# Patient Record
Sex: FEMALE
[De-identification: names, ages, dates, MRNs, and addresses within clinical notes are randomized per-mention and may not be internally consistent; named-entity substitution may affect disease eponyms.]

---

## 2018-09-07 ENCOUNTER — APPOINTMENT (OUTPATIENT)
Dept: ANTEPARTUM | Facility: CLINIC | Age: 33
End: 2018-09-07
Payer: COMMERCIAL

## 2018-09-07 ENCOUNTER — ASOB RESULT (OUTPATIENT)
Age: 33
End: 2018-09-07

## 2018-09-07 PROBLEM — Z00.00 ENCOUNTER FOR PREVENTIVE HEALTH EXAMINATION: Status: ACTIVE | Noted: 2018-09-07

## 2018-09-07 PROCEDURE — 76801 OB US < 14 WKS SINGLE FETUS: CPT

## 2018-09-07 PROCEDURE — 76813 OB US NUCHAL MEAS 1 GEST: CPT

## 2018-10-31 ENCOUNTER — APPOINTMENT (OUTPATIENT)
Dept: ANTEPARTUM | Facility: CLINIC | Age: 33
End: 2018-10-31

## 2018-11-02 ENCOUNTER — ASOB RESULT (OUTPATIENT)
Age: 33
End: 2018-11-02

## 2018-11-02 ENCOUNTER — APPOINTMENT (OUTPATIENT)
Dept: ANTEPARTUM | Facility: CLINIC | Age: 33
End: 2018-11-02
Payer: COMMERCIAL

## 2018-11-02 PROCEDURE — 76811 OB US DETAILED SNGL FETUS: CPT

## 2018-11-16 ENCOUNTER — TRANSCRIPTION ENCOUNTER (OUTPATIENT)
Age: 33
End: 2018-11-16

## 2019-01-03 ENCOUNTER — ASOB RESULT (OUTPATIENT)
Age: 34
End: 2019-01-03

## 2019-01-03 ENCOUNTER — APPOINTMENT (OUTPATIENT)
Dept: ANTEPARTUM | Facility: CLINIC | Age: 34
End: 2019-01-03
Payer: COMMERCIAL

## 2019-01-03 PROCEDURE — 76816 OB US FOLLOW-UP PER FETUS: CPT

## 2019-01-16 ENCOUNTER — APPOINTMENT (OUTPATIENT)
Dept: MATERNAL FETAL MEDICINE | Facility: CLINIC | Age: 34
End: 2019-01-16
Payer: COMMERCIAL

## 2019-01-16 ENCOUNTER — ASOB RESULT (OUTPATIENT)
Age: 34
End: 2019-01-16

## 2019-01-16 VITALS — WEIGHT: 207.5 LBS | HEIGHT: 61 IN | BODY MASS INDEX: 39.18 KG/M2

## 2019-01-16 DIAGNOSIS — Z86.79 PERSONAL HISTORY OF OTHER DISEASES OF THE CIRCULATORY SYSTEM: ICD-10-CM

## 2019-01-16 DIAGNOSIS — Z83.3 FAMILY HISTORY OF DIABETES MELLITUS: ICD-10-CM

## 2019-01-16 PROCEDURE — G0108 DIAB MANAGE TRN  PER INDIV: CPT

## 2019-01-31 ENCOUNTER — APPOINTMENT (OUTPATIENT)
Dept: ANTEPARTUM | Facility: CLINIC | Age: 34
End: 2019-01-31
Payer: COMMERCIAL

## 2019-01-31 ENCOUNTER — APPOINTMENT (OUTPATIENT)
Dept: MATERNAL FETAL MEDICINE | Facility: CLINIC | Age: 34
End: 2019-01-31
Payer: COMMERCIAL

## 2019-01-31 ENCOUNTER — ASOB RESULT (OUTPATIENT)
Age: 34
End: 2019-01-31

## 2019-01-31 VITALS
WEIGHT: 208 LBS | HEART RATE: 88 BPM | DIASTOLIC BLOOD PRESSURE: 62 MMHG | SYSTOLIC BLOOD PRESSURE: 116 MMHG | HEIGHT: 61 IN | BODY MASS INDEX: 39.27 KG/M2

## 2019-01-31 DIAGNOSIS — J45.909 UNSPECIFIED ASTHMA, UNCOMPLICATED: ICD-10-CM

## 2019-01-31 DIAGNOSIS — Z82.49 FAMILY HISTORY OF ISCHEMIC HEART DISEASE AND OTHER DISEASES OF THE CIRCULATORY SYSTEM: ICD-10-CM

## 2019-01-31 PROCEDURE — 76818 FETAL BIOPHYS PROFILE W/NST: CPT

## 2019-01-31 PROCEDURE — 76816 OB US FOLLOW-UP PER FETUS: CPT

## 2019-01-31 PROCEDURE — 99243 OFF/OP CNSLTJ NEW/EST LOW 30: CPT

## 2019-01-31 NOTE — DISCUSSION/SUMMARY
[FreeTextEntry1] : The patient is a 33-year-old  being seen today at approximately 33 weeks for gestational diabetes A1 as well as chronic hypertension.\par \par The patient has had nutritional counseling. Evaluation of her diabetic flow sheets reveals that all of her fasting and majority of her postprandial blood sugars are within normal limits. those postprandial blood sugars that are elevated are due to dietary intake. A level 1 ultrasound was performed today which does reveal a single viable intrauterine gestation with the estimated fetal weight 4 lbs. 5 oz. consistent with the 42nd percentile. Vertex presentation with anterior placenta is seen and the amniotic fluid index is normal at 15.92 cm. Vital signs today reveal a blood pressure of 116/62 and maternal weight is 208 pounds which is a half pound weight gain from the previous evaluation done on . This is consistent with a BMI of 39.30KG.\par \par Management of the pregnancy was discussed. The patient will continue on the current ADA diet and will continue home glucose monitoring. She has a followup nutrition consultation next week. She will return in one month for a followup growth scan and NST as well as followup maternal fetal medicine consultation. Weekly  testing will be performed after that until delivery. Dina also states that she was on labetalol until the second trimester when her blood pressure was found to be low and has been normotensive since. Careful evaluation for possible preeclampsia at the end of this pregnancy is recommended. All of the above was discussed with the patient and her  all of their questions were answered.\par \par Family history is significant for her mother with hypertensive disease as well as a myocardial infarction. Father does suffer from A. fib. Her past medical history significant for hypertension diagnosed approximately 4 years ago. She has no previous surgical history hand is allergic to ciprofloxacin. She denies alcohol, tobacco or drug use.\par \par Recommendations;\par \par #1. Continue current ADA diet.\par #2. Initial consultation in one week.\par #3. Growth scan in one month is recommended.\par #4. Weekly  testing beginning at 37 weeks until delivery is recommended.\par #5. MFM consultation one month is recommended.

## 2019-02-06 ENCOUNTER — ASOB RESULT (OUTPATIENT)
Age: 34
End: 2019-02-06

## 2019-02-06 ENCOUNTER — APPOINTMENT (OUTPATIENT)
Dept: MATERNAL FETAL MEDICINE | Facility: CLINIC | Age: 34
End: 2019-02-06
Payer: COMMERCIAL

## 2019-02-06 VITALS — HEIGHT: 61 IN | WEIGHT: 207.38 LBS | BODY MASS INDEX: 39.16 KG/M2

## 2019-02-06 PROCEDURE — G0108 DIAB MANAGE TRN  PER INDIV: CPT

## 2019-02-13 ENCOUNTER — ASOB RESULT (OUTPATIENT)
Age: 34
End: 2019-02-13

## 2019-02-13 ENCOUNTER — APPOINTMENT (OUTPATIENT)
Dept: MATERNAL FETAL MEDICINE | Facility: CLINIC | Age: 34
End: 2019-02-13
Payer: COMMERCIAL

## 2019-02-13 VITALS — WEIGHT: 210.13 LBS | BODY MASS INDEX: 39.67 KG/M2 | HEIGHT: 61 IN

## 2019-02-13 PROCEDURE — XXXXX: CPT

## 2019-02-28 ENCOUNTER — APPOINTMENT (OUTPATIENT)
Dept: ANTEPARTUM | Facility: CLINIC | Age: 34
End: 2019-02-28
Payer: COMMERCIAL

## 2019-02-28 ENCOUNTER — APPOINTMENT (OUTPATIENT)
Dept: MATERNAL FETAL MEDICINE | Facility: CLINIC | Age: 34
End: 2019-02-28
Payer: COMMERCIAL

## 2019-02-28 ENCOUNTER — ASOB RESULT (OUTPATIENT)
Age: 34
End: 2019-02-28

## 2019-02-28 VITALS
HEIGHT: 61 IN | SYSTOLIC BLOOD PRESSURE: 128 MMHG | BODY MASS INDEX: 39.84 KG/M2 | WEIGHT: 211 LBS | HEART RATE: 82 BPM | DIASTOLIC BLOOD PRESSURE: 78 MMHG

## 2019-02-28 DIAGNOSIS — O10.919 UNSPECIFIED PRE-EXISTING HYPERTENSION COMPLICATING PREGNANCY, UNSPECIFIED TRIMESTER: ICD-10-CM

## 2019-02-28 PROCEDURE — 76818 FETAL BIOPHYS PROFILE W/NST: CPT

## 2019-02-28 PROCEDURE — 99242 OFF/OP CONSLTJ NEW/EST SF 20: CPT

## 2019-02-28 PROCEDURE — 76816 OB US FOLLOW-UP PER FETUS: CPT

## 2019-02-28 NOTE — DISCUSSION/SUMMARY
[FreeTextEntry1] : Please see the previous consultation for the patient's medical and obstetrical history.\par \par Evaluation of the patient's diabetic flow sheets reveals good control of both fasting and postprandial blood sugars. A level I ultrasound was performed today which does reveal a single viable intrauterine gestation with the estimated fetal weight 6 lbs. 10 oz. which is consistent with the 59th %.  Vertex presentation and anterior placenta seen and amniotic fluid index is normal at 13.0 cm. Vital signs today reveal blood pressure 120/78 and maternal weight is 211 pounds which is a 1 pound weight gain from previous evaluation done on February 13th. This is consistent with a BMI of 39.87KG.\par \par Management of the pregnancy was discussed. At this time no change in her medical management is indicated. Weekly biophysical profile and NST is recommend until delivery. Delivery by the patient's EDC is also recommended. All of the above was discussed with the patient and her  and all their questions were answered.\par \par Recommendations;\par \par #1. Continue current ADA diet.\par #2. Weekly biophysical profile in our office and NST in your office.\par #3. Delivery by the patient's EDC is recommended.\par #4. Followup MFM consultation if clinically indicated.

## 2019-03-01 ENCOUNTER — TRANSCRIPTION ENCOUNTER (OUTPATIENT)
Age: 34
End: 2019-03-01

## 2019-03-07 ENCOUNTER — APPOINTMENT (OUTPATIENT)
Dept: ANTEPARTUM | Facility: CLINIC | Age: 34
End: 2019-03-07
Payer: COMMERCIAL

## 2019-03-07 ENCOUNTER — APPOINTMENT (OUTPATIENT)
Age: 34
End: 2019-03-07
Payer: COMMERCIAL

## 2019-03-07 ENCOUNTER — ASOB RESULT (OUTPATIENT)
Age: 34
End: 2019-03-07

## 2019-03-07 PROCEDURE — 76819 FETAL BIOPHYS PROFIL W/O NST: CPT

## 2019-03-14 ENCOUNTER — APPOINTMENT (OUTPATIENT)
Age: 34
End: 2019-03-14
Payer: COMMERCIAL

## 2019-03-14 ENCOUNTER — APPOINTMENT (OUTPATIENT)
Dept: ANTEPARTUM | Facility: CLINIC | Age: 34
End: 2019-03-14
Payer: COMMERCIAL

## 2019-03-14 ENCOUNTER — ASOB RESULT (OUTPATIENT)
Age: 34
End: 2019-03-14

## 2019-03-14 PROCEDURE — 76815 OB US LIMITED FETUS(S): CPT

## 2019-03-14 PROCEDURE — 76819 FETAL BIOPHYS PROFIL W/O NST: CPT

## 2019-03-15 ENCOUNTER — INPATIENT (INPATIENT)
Facility: HOSPITAL | Age: 34
LOS: 4 days | Discharge: HOME | End: 2019-03-20
Attending: OBSTETRICS & GYNECOLOGY | Admitting: OBSTETRICS & GYNECOLOGY
Payer: COMMERCIAL

## 2019-03-15 ENCOUNTER — TRANSCRIPTION ENCOUNTER (OUTPATIENT)
Age: 34
End: 2019-03-15

## 2019-03-15 VITALS
WEIGHT: 214.95 LBS | HEIGHT: 61 IN | HEART RATE: 96 BPM | SYSTOLIC BLOOD PRESSURE: 123 MMHG | RESPIRATION RATE: 18 BRPM | DIASTOLIC BLOOD PRESSURE: 59 MMHG | TEMPERATURE: 99 F

## 2019-03-15 DIAGNOSIS — O26.893 OTHER SPECIFIED PREGNANCY RELATED CONDITIONS, THIRD TRIMESTER: ICD-10-CM

## 2019-03-15 DIAGNOSIS — O47.1 FALSE LABOR AT OR AFTER 37 COMPLETED WEEKS OF GESTATION: ICD-10-CM

## 2019-03-15 LAB
ABO RH CONFIRMATION: SIGNIFICANT CHANGE UP
APPEARANCE UR: CLEAR — SIGNIFICANT CHANGE UP
BASOPHILS # BLD AUTO: 0 K/UL — SIGNIFICANT CHANGE UP (ref 0–0.2)
BASOPHILS NFR BLD AUTO: 0.1 % — SIGNIFICANT CHANGE UP (ref 0–2)
BILIRUB UR-MCNC: NEGATIVE — SIGNIFICANT CHANGE UP
BLD GP AB SCN SERPL QL: SIGNIFICANT CHANGE UP
COLOR SPEC: YELLOW — SIGNIFICANT CHANGE UP
DIFF PNL FLD: NEGATIVE — SIGNIFICANT CHANGE UP
DIR ANTIGLOB POLYSPECIFIC INTERPRETATION: SIGNIFICANT CHANGE UP
EOSINOPHIL # BLD AUTO: 0.1 K/UL — SIGNIFICANT CHANGE UP (ref 0–0.5)
EOSINOPHIL NFR BLD AUTO: 1 % — SIGNIFICANT CHANGE UP (ref 0–6)
GLUCOSE BLDC GLUCOMTR-MCNC: 105 MG/DL — HIGH (ref 70–99)
GLUCOSE BLDC GLUCOMTR-MCNC: 106 MG/DL — HIGH (ref 70–99)
GLUCOSE BLDC GLUCOMTR-MCNC: 85 MG/DL — SIGNIFICANT CHANGE UP (ref 70–99)
GLUCOSE UR QL: NEGATIVE MG/DL — SIGNIFICANT CHANGE UP
HCT VFR BLD CALC: 34.3 % — LOW (ref 37–47)
HGB BLD-MCNC: 11.6 G/DL — LOW (ref 12–16)
KETONES UR-MCNC: NEGATIVE — SIGNIFICANT CHANGE UP
LEUKOCYTE ESTERASE UR-ACNC: NEGATIVE — SIGNIFICANT CHANGE UP
LYMPHOCYTES # BLD AUTO: 1.2 K/UL — SIGNIFICANT CHANGE UP (ref 1–4.8)
LYMPHOCYTES # BLD AUTO: 13.5 % — LOW (ref 20–55)
MCHC RBC-ENTMCNC: 31.7 PG — HIGH (ref 27–31)
MCHC RBC-ENTMCNC: 33.8 G/DL — SIGNIFICANT CHANGE UP (ref 32–36)
MCV RBC AUTO: 93.7 FL — SIGNIFICANT CHANGE UP (ref 81–99)
MONOCYTES # BLD AUTO: 0.7 K/UL — SIGNIFICANT CHANGE UP (ref 0–0.8)
MONOCYTES NFR BLD AUTO: 8 % — SIGNIFICANT CHANGE UP (ref 3–10)
NEUTROPHILS # BLD AUTO: 7 K/UL — SIGNIFICANT CHANGE UP (ref 1.8–8)
NEUTROPHILS NFR BLD AUTO: 76.8 % — HIGH (ref 37–73)
NITRITE UR-MCNC: NEGATIVE — SIGNIFICANT CHANGE UP
PH UR: 5 — SIGNIFICANT CHANGE UP (ref 5–8)
PLATELET # BLD AUTO: 242 K/UL — SIGNIFICANT CHANGE UP (ref 150–400)
PROT UR-MCNC: NEGATIVE MG/DL — SIGNIFICANT CHANGE UP
RBC # BLD: 3.66 M/UL — LOW (ref 4.4–5.2)
RBC # FLD: 13.5 % — SIGNIFICANT CHANGE UP (ref 11–15.6)
SP GR SPEC: 1.02 — SIGNIFICANT CHANGE UP (ref 1.01–1.02)
TYPE + AB SCN PNL BLD: SIGNIFICANT CHANGE UP
UROBILINOGEN FLD QL: NEGATIVE MG/DL — SIGNIFICANT CHANGE UP
WBC # BLD: 9.1 K/UL — SIGNIFICANT CHANGE UP (ref 4.8–10.8)
WBC # FLD AUTO: 9.1 K/UL — SIGNIFICANT CHANGE UP (ref 4.8–10.8)

## 2019-03-15 RX ORDER — SODIUM CHLORIDE 9 MG/ML
1000 INJECTION, SOLUTION INTRAVENOUS
Qty: 0 | Refills: 0 | Status: DISCONTINUED | OUTPATIENT
Start: 2019-03-15 | End: 2019-03-16

## 2019-03-15 RX ORDER — CEFAZOLIN SODIUM 1 G
2000 VIAL (EA) INJECTION ONCE
Qty: 0 | Refills: 0 | Status: DISCONTINUED | OUTPATIENT
Start: 2019-03-15 | End: 2019-03-15

## 2019-03-15 RX ORDER — SODIUM CHLORIDE 9 MG/ML
1000 INJECTION, SOLUTION INTRAVENOUS ONCE
Qty: 0 | Refills: 0 | Status: DISCONTINUED | OUTPATIENT
Start: 2019-03-15 | End: 2019-03-16

## 2019-03-15 RX ORDER — OXYTOCIN 10 UNIT/ML
333.33 VIAL (ML) INJECTION
Qty: 20 | Refills: 0 | Status: DISCONTINUED | OUTPATIENT
Start: 2019-03-15 | End: 2019-03-16

## 2019-03-15 RX ORDER — CITRIC ACID/SODIUM CITRATE 300-500 MG
30 SOLUTION, ORAL ORAL ONCE
Qty: 0 | Refills: 0 | Status: COMPLETED | OUTPATIENT
Start: 2019-03-15 | End: 2019-03-16

## 2019-03-15 RX ADMIN — SODIUM CHLORIDE 125 MILLILITER(S): 9 INJECTION, SOLUTION INTRAVENOUS at 20:25

## 2019-03-15 RX ADMIN — SODIUM CHLORIDE 125 MILLILITER(S): 9 INJECTION, SOLUTION INTRAVENOUS at 13:01

## 2019-03-15 NOTE — OB RN PATIENT PROFILE - PMH
Diet controlled gestational diabetes mellitus (GDM) in third trimester    Hypertension, unspecified type  On Labetalol before pregnancy and in first trimester Asthma    Diet controlled gestational diabetes mellitus (GDM) in third trimester    GERD (gastroesophageal reflux disease)    Hypertension, unspecified type  On Labetalol before pregnancy and in first trimester

## 2019-03-15 NOTE — OB PROVIDER H&P - PMH
Diet controlled gestational diabetes mellitus (GDM) in third trimester    Hypertension, unspecified type  On Labetalol before pregnancy and in first trimester

## 2019-03-15 NOTE — OB RN PATIENT PROFILE - EDUCATION OF PARENTS ON THE BENEFITS OF SKIN TO SKIN AND BREASTFEEDING TO BOTH MOTHER AND INFANT.
Patient was called today to reschedule apt with cardiology. Tried both numbers listed, no answer. Voice mail not set up to leave a message on home number. Busy signal for mobile number.   
Statement Selected

## 2019-03-15 NOTE — OB PROVIDER H&P - HISTORY OF PRESENT ILLNESS
32yo  @ 39.2w, pregnancy complicated by GDMA1, who comes in for an induction. Pt denies any ctx, vb, lof. Pt reports +fm. Pt has h/o of chtn, not requiring medications during pregnancy.

## 2019-03-15 NOTE — CHART NOTE - NSCHARTNOTEFT_GEN_A_CORE
Pt is comfortable    Bedside sono: vertex, anterior fundal placenta on the R side, EFW 3700g  Vital Signs Last 24 Hrs  T(C): 37.4 (15 Mar 2019 12:10), Max: 37.4 (15 Mar 2019 12:10)  T(F): 99.3 (15 Mar 2019 12:10), Max: 99.3 (15 Mar 2019 12:10)  HR: 96 (15 Mar 2019 12:15) (96 - 96)  BP: 123/59 (15 Mar 2019 12:15) (123/59 - 123/59)  RR: 18 (15 Mar 2019 12:10) (18 - 18)    FETAL HEART RATE: 130, moderate, + accels, - decels    [place]: irregular contractions    INTERVENTIONS: start IOL with Cytotec

## 2019-03-15 NOTE — OB PROVIDER H&P - ASSESSMENT
34yo  @ 39.2w a/f IOL for GDMA1. Pt to receive cytotec induction  - Admission labs  - Cytotec induction  - CEFM

## 2019-03-16 ENCOUNTER — TRANSCRIPTION ENCOUNTER (OUTPATIENT)
Age: 34
End: 2019-03-16

## 2019-03-16 LAB
GLUCOSE BLDC GLUCOMTR-MCNC: 78 MG/DL — SIGNIFICANT CHANGE UP (ref 70–99)
GLUCOSE BLDC GLUCOMTR-MCNC: 81 MG/DL — SIGNIFICANT CHANGE UP (ref 70–99)
GLUCOSE BLDC GLUCOMTR-MCNC: 82 MG/DL — SIGNIFICANT CHANGE UP (ref 70–99)
GLUCOSE BLDC GLUCOMTR-MCNC: 82 MG/DL — SIGNIFICANT CHANGE UP (ref 70–99)

## 2019-03-16 RX ORDER — GLYCERIN ADULT
1 SUPPOSITORY, RECTAL RECTAL AT BEDTIME
Qty: 0 | Refills: 0 | Status: DISCONTINUED | OUTPATIENT
Start: 2019-03-16 | End: 2019-03-20

## 2019-03-16 RX ORDER — OXYTOCIN 10 UNIT/ML
333.33 VIAL (ML) INJECTION
Qty: 20 | Refills: 0 | Status: DISCONTINUED | OUTPATIENT
Start: 2019-03-16 | End: 2019-03-16

## 2019-03-16 RX ORDER — ONDANSETRON 8 MG/1
4 TABLET, FILM COATED ORAL EVERY 6 HOURS
Qty: 0 | Refills: 0 | Status: DISCONTINUED | OUTPATIENT
Start: 2019-03-16 | End: 2019-03-16

## 2019-03-16 RX ORDER — BUTORPHANOL TARTRATE 2 MG/ML
2 INJECTION, SOLUTION INTRAMUSCULAR; INTRAVENOUS ONCE
Qty: 0 | Refills: 0 | Status: DISCONTINUED | OUTPATIENT
Start: 2019-03-16 | End: 2019-03-16

## 2019-03-16 RX ORDER — SIMETHICONE 80 MG/1
80 TABLET, CHEWABLE ORAL EVERY 4 HOURS
Qty: 0 | Refills: 0 | Status: DISCONTINUED | OUTPATIENT
Start: 2019-03-16 | End: 2019-03-20

## 2019-03-16 RX ORDER — OXYTOCIN 10 UNIT/ML
41.67 VIAL (ML) INJECTION
Qty: 20 | Refills: 0 | Status: DISCONTINUED | OUTPATIENT
Start: 2019-03-16 | End: 2019-03-20

## 2019-03-16 RX ORDER — SODIUM CHLORIDE 9 MG/ML
1000 INJECTION, SOLUTION INTRAVENOUS
Qty: 0 | Refills: 0 | Status: DISCONTINUED | OUTPATIENT
Start: 2019-03-16 | End: 2019-03-20

## 2019-03-16 RX ORDER — AMPICILLIN TRIHYDRATE 250 MG
1 CAPSULE ORAL EVERY 4 HOURS
Qty: 0 | Refills: 0 | Status: DISCONTINUED | OUTPATIENT
Start: 2019-03-16 | End: 2019-03-16

## 2019-03-16 RX ORDER — LANOLIN
1 OINTMENT (GRAM) TOPICAL
Qty: 0 | Refills: 0 | Status: DISCONTINUED | OUTPATIENT
Start: 2019-03-16 | End: 2019-03-20

## 2019-03-16 RX ORDER — DIPHENHYDRAMINE HCL 50 MG
25 CAPSULE ORAL EVERY 6 HOURS
Qty: 0 | Refills: 0 | Status: DISCONTINUED | OUTPATIENT
Start: 2019-03-16 | End: 2019-03-20

## 2019-03-16 RX ORDER — FERROUS SULFATE 325(65) MG
325 TABLET ORAL DAILY
Qty: 0 | Refills: 0 | Status: DISCONTINUED | OUTPATIENT
Start: 2019-03-16 | End: 2019-03-20

## 2019-03-16 RX ORDER — ONDANSETRON 8 MG/1
4 TABLET, FILM COATED ORAL EVERY 6 HOURS
Qty: 0 | Refills: 0 | Status: DISCONTINUED | OUTPATIENT
Start: 2019-03-16 | End: 2019-03-20

## 2019-03-16 RX ORDER — DIPHENHYDRAMINE HCL 50 MG
25 CAPSULE ORAL ONCE
Qty: 0 | Refills: 0 | Status: DISCONTINUED | OUTPATIENT
Start: 2019-03-17 | End: 2019-03-20

## 2019-03-16 RX ORDER — AZITHROMYCIN 500 MG/1
500 TABLET, FILM COATED ORAL ONCE
Qty: 0 | Refills: 0 | Status: DISCONTINUED | OUTPATIENT
Start: 2019-03-16 | End: 2019-03-16

## 2019-03-16 RX ORDER — AMPICILLIN TRIHYDRATE 250 MG
2 CAPSULE ORAL ONCE
Qty: 0 | Refills: 0 | Status: COMPLETED | OUTPATIENT
Start: 2019-03-16 | End: 2019-03-16

## 2019-03-16 RX ORDER — OXYTOCIN 10 UNIT/ML
41.67 VIAL (ML) INJECTION
Qty: 20 | Refills: 0 | Status: DISCONTINUED | OUTPATIENT
Start: 2019-03-16 | End: 2019-03-16

## 2019-03-16 RX ORDER — OXYCODONE AND ACETAMINOPHEN 5; 325 MG/1; MG/1
1 TABLET ORAL
Qty: 0 | Refills: 0 | Status: DISCONTINUED | OUTPATIENT
Start: 2019-03-16 | End: 2019-03-18

## 2019-03-16 RX ORDER — DIPHENHYDRAMINE HCL 50 MG
25 CAPSULE ORAL EVERY 4 HOURS
Qty: 0 | Refills: 0 | Status: DISCONTINUED | OUTPATIENT
Start: 2019-03-16 | End: 2019-03-20

## 2019-03-16 RX ORDER — DOCUSATE SODIUM 100 MG
100 CAPSULE ORAL
Qty: 0 | Refills: 0 | Status: DISCONTINUED | OUTPATIENT
Start: 2019-03-16 | End: 2019-03-20

## 2019-03-16 RX ORDER — OXYTOCIN 10 UNIT/ML
2 VIAL (ML) INJECTION
Qty: 30 | Refills: 0 | Status: DISCONTINUED | OUTPATIENT
Start: 2019-03-16 | End: 2019-03-16

## 2019-03-16 RX ORDER — KETOROLAC TROMETHAMINE 30 MG/ML
30 SYRINGE (ML) INJECTION ONCE
Qty: 0 | Refills: 0 | Status: DISCONTINUED | OUTPATIENT
Start: 2019-03-16 | End: 2019-03-16

## 2019-03-16 RX ORDER — SODIUM CHLORIDE 9 MG/ML
1000 INJECTION, SOLUTION INTRAVENOUS
Qty: 0 | Refills: 0 | Status: DISCONTINUED | OUTPATIENT
Start: 2019-03-16 | End: 2019-03-16

## 2019-03-16 RX ORDER — NALBUPHINE HYDROCHLORIDE 10 MG/ML
2.5 INJECTION, SOLUTION INTRAMUSCULAR; INTRAVENOUS; SUBCUTANEOUS EVERY 6 HOURS
Qty: 0 | Refills: 0 | Status: DISCONTINUED | OUTPATIENT
Start: 2019-03-16 | End: 2019-03-20

## 2019-03-16 RX ORDER — CEFAZOLIN SODIUM 1 G
2000 VIAL (EA) INJECTION ONCE
Qty: 0 | Refills: 0 | Status: DISCONTINUED | OUTPATIENT
Start: 2019-03-16 | End: 2019-03-16

## 2019-03-16 RX ORDER — KETOROLAC TROMETHAMINE 30 MG/ML
30 SYRINGE (ML) INJECTION EVERY 6 HOURS
Qty: 0 | Refills: 0 | Status: DISCONTINUED | OUTPATIENT
Start: 2019-03-16 | End: 2019-03-17

## 2019-03-16 RX ORDER — TETANUS TOXOID, REDUCED DIPHTHERIA TOXOID AND ACELLULAR PERTUSSIS VACCINE, ADSORBED 5; 2.5; 8; 8; 2.5 [IU]/.5ML; [IU]/.5ML; UG/.5ML; UG/.5ML; UG/.5ML
0.5 SUSPENSION INTRAMUSCULAR ONCE
Qty: 0 | Refills: 0 | Status: DISCONTINUED | OUTPATIENT
Start: 2019-03-16 | End: 2019-03-20

## 2019-03-16 RX ORDER — CARBOPROST TROMETHAMINE 250 UG/ML
250 INJECTION, SOLUTION INTRAMUSCULAR ONCE
Qty: 0 | Refills: 0 | Status: COMPLETED | OUTPATIENT
Start: 2019-03-16 | End: 2019-03-16

## 2019-03-16 RX ORDER — ENOXAPARIN SODIUM 100 MG/ML
40 INJECTION SUBCUTANEOUS EVERY 24 HOURS
Qty: 0 | Refills: 0 | Status: DISCONTINUED | OUTPATIENT
Start: 2019-03-17 | End: 2019-03-20

## 2019-03-16 RX ORDER — AMPICILLIN TRIHYDRATE 250 MG
CAPSULE ORAL
Qty: 0 | Refills: 0 | Status: DISCONTINUED | OUTPATIENT
Start: 2019-03-16 | End: 2019-03-16

## 2019-03-16 RX ORDER — ACETAMINOPHEN 500 MG
1000 TABLET ORAL ONCE
Qty: 0 | Refills: 0 | Status: DISCONTINUED | OUTPATIENT
Start: 2019-03-17 | End: 2019-03-20

## 2019-03-16 RX ORDER — IBUPROFEN 200 MG
600 TABLET ORAL EVERY 6 HOURS
Qty: 0 | Refills: 0 | Status: DISCONTINUED | OUTPATIENT
Start: 2019-03-16 | End: 2019-03-20

## 2019-03-16 RX ORDER — AZITHROMYCIN 500 MG/1
500 TABLET, FILM COATED ORAL ONCE
Qty: 0 | Refills: 0 | Status: COMPLETED | OUTPATIENT
Start: 2019-03-16 | End: 2019-03-16

## 2019-03-16 RX ORDER — OXYCODONE AND ACETAMINOPHEN 5; 325 MG/1; MG/1
2 TABLET ORAL EVERY 6 HOURS
Qty: 0 | Refills: 0 | Status: DISCONTINUED | OUTPATIENT
Start: 2019-03-16 | End: 2019-03-18

## 2019-03-16 RX ORDER — NALOXONE HYDROCHLORIDE 4 MG/.1ML
0.1 SPRAY NASAL
Qty: 0 | Refills: 0 | Status: DISCONTINUED | OUTPATIENT
Start: 2019-03-16 | End: 2019-03-20

## 2019-03-16 RX ORDER — ONDANSETRON 8 MG/1
4 TABLET, FILM COATED ORAL ONCE
Qty: 0 | Refills: 0 | Status: COMPLETED | OUTPATIENT
Start: 2019-03-16 | End: 2019-03-16

## 2019-03-16 RX ADMIN — BUTORPHANOL TARTRATE 2 MILLIGRAM(S): 2 INJECTION, SOLUTION INTRAMUSCULAR; INTRAVENOUS at 03:30

## 2019-03-16 RX ADMIN — BUTORPHANOL TARTRATE 2 MILLIGRAM(S): 2 INJECTION, SOLUTION INTRAMUSCULAR; INTRAVENOUS at 02:39

## 2019-03-16 RX ADMIN — Medication 108 GRAM(S): at 12:30

## 2019-03-16 RX ADMIN — ONDANSETRON 4 MILLIGRAM(S): 8 TABLET, FILM COATED ORAL at 02:59

## 2019-03-16 RX ADMIN — Medication 30 MILLILITER(S): at 07:45

## 2019-03-16 RX ADMIN — CARBOPROST TROMETHAMINE 250 MICROGRAM(S): 250 INJECTION, SOLUTION INTRAMUSCULAR at 19:33

## 2019-03-16 RX ADMIN — Medication 0.2 MILLIGRAM(S): at 19:27

## 2019-03-16 RX ADMIN — Medication 125 MILLIUNIT(S)/MIN: at 20:05

## 2019-03-16 RX ADMIN — Medication 108 GRAM(S): at 16:08

## 2019-03-16 RX ADMIN — AZITHROMYCIN 255 MILLIGRAM(S): 500 TABLET, FILM COATED ORAL at 18:30

## 2019-03-16 RX ADMIN — Medication 108 GRAM(S): at 08:00

## 2019-03-16 RX ADMIN — Medication 2 MILLIUNIT(S)/MIN: at 08:34

## 2019-03-16 RX ADMIN — Medication 100 MILLIGRAM(S): at 18:25

## 2019-03-16 NOTE — CHART NOTE - NSCHARTNOTEFT_GEN_A_CORE
Vital Signs Last 24 Hrs  T(C): 37.2 (16 Mar 2019 11:28), Max: 37.2 (16 Mar 2019 11:28)  T(F): 98.96 (16 Mar 2019 11:28), Max: 98.96 (16 Mar 2019 11:28)  HR: 88 (16 Mar 2019 13:04) (54 - 103)  BP: 121/58 (16 Mar 2019 13:04) (107/57 - 144/65)  BP(mean): --  RR: 18 (16 Mar 2019 05:04) (14 - 18)  SpO2: 98% (16 Mar 2019 08:05) (92% - 100%)    FETAL HEART RATE   Baseline:  Variability: [  ] absent [  ] minimal [ x ] moderate [  ] marked  Accelerations: [ x ] present 15x15 or higher [  ] present 10x10 only  [  ] absent     DECELERATIONS:  [ x ] Absent  [  ] Early  [  ] Variable                 [  ] Late present:  [ ] </= 2 decelerations in 30 min  [ ] more than 2 decelerations in 30 min  [  ] Prolonged: [ ] present [ ] absent    UTERINE CONTRACTIONS:  [ x ] present      [  ] absent  Frequency     [ x ] 3 or more in 10 minutes        [  ] less than 3 in 10 minutes    CERVICAL EXAM:  Dilation: 5cm  Effacement: 80%  Station: -2    [  ] AROM  [  ] SROM               [ x ] clear               [  ] meconium stained                [  ] thick meconium     [  ] IUPC     [ x ] FSE    IMPRESSION:   [ x ] Normal labor course   [  ] Protracted/ Arrest in active phase   [  ] Protracted/ Arrest in second stage  FHR Category: 1  Additional:    INTERVENTIONS:  [  ] Start Pitocin  [ x ] Continue Pitocin [  ] Increase Pitocin    [  ] Decrease Pitocin   [  ] Discontinue Pitocin  Cervical Ripening:     [  ] Start Cytotec    [  ] Stop Cytotec  Additional:

## 2019-03-16 NOTE — OB NEONATOLOGY/PEDIATRICIAN DELIVERY SUMMARY - NSPEDSNEONOTESA_OBGYN_ALL_OB_FT
Dr. Woods requested me to attend P C/S at 39.3 weeks due to FTP with NRFHT as per OB. The mother is 32 y/o, , A Neg, GBS+, HIV NR, RPR NR, HBsAg NR, RI. She is GDM   L & D: clear fluid, vigorous, suctioned and dried  Asst: full term appropriate for gestational age, BB, P C/S  Plan: Observe in transition, if stable admit to NBN. Dr. Woods requested me to attend P C/S at 39.3 weeks due to FTP with arrest of dilatation as per OB. The mother is 32 y/o, , A Neg, GBS+, HIV NR, RPR NR, HBsAg NR, RI. She is GDM   L & D: clear fluid, vigorous, suctioned and dried, A/S 9& 9, BW: 3280gm (7-4), Passed meconium, and voided  Asst: full term appropriate for gestational age, BB, P C/S  Plan: Observe in transition, if stable admit to NBN.

## 2019-03-16 NOTE — OB PROVIDER DELIVERY SUMMARY - NSPROVIDERDELIVERYNOTE_OBGYN_ALL_OB_FT
IUP @ 39w, gdm, arrest of descent  primary  section, low transverse  delivery of viable male infant  APGARS 9/9  7lbs 4 oz  EBL 1000

## 2019-03-16 NOTE — CHART NOTE - NSCHARTNOTEFT_GEN_A_CORE
Pt is feeling 6/10 pain with contractions and wants pain reliever  Vital Signs Last 24 Hrs  T(C): 36.8 (16 Mar 2019 00:35), Max: 37.4 (15 Mar 2019 12:10)  T(F): 98.24 (16 Mar 2019 00:35), Max: 99.3 (15 Mar 2019 12:10)  HR: 71 (16 Mar 2019 00:35) (71 - 96)  BP: 114/66 (16 Mar 2019 00:35) (114/66 - 125/58)  RR: 18 (16 Mar 2019 00:35) (14 - 18)    FETAL HEART RATE: 130. moderate, + accels, - decels    [place]: irregular contractions q 2-5 min    CERVICAL EXAM: 1/80/-2    PAIN SCALE (0-10): 6/10    IMPRESSION: will give stadol 2mg iv    INTERVENTIONS: continue IOL, anticipate

## 2019-03-16 NOTE — CHART NOTE - NSCHARTNOTEFT_GEN_A_CORE
Pt seen and examined at bedside.  Pt feeling more pain from contractions.  Pt also feeling more nauseas after receiving stadol around 2AM.  Pt examined: 2/60-70/-2  Pt FH: Cat I tracing  Pt Blanket: q2-3min contractions    Pt to receive zofran for nausea  Will switch from Cytotec to pitocin for induction  Pt desires epidural for pain. Will receive epidural

## 2019-03-16 NOTE — CHART NOTE - NSCHARTNOTEFT_GEN_A_CORE
Vital Signs Last 24 Hrs  T(C): 36.5 (16 Mar 2019 05:04), Max: 37.4 (15 Mar 2019 12:10)  T(F): 97.7 (16 Mar 2019 05:04), Max: 99.3 (15 Mar 2019 12:10)  HR: 96 (16 Mar 2019 08:05) (54 - 103)  BP: 136/62 (16 Mar 2019 08:03) (114/66 - 144/65)  BP(mean): --  RR: 18 (16 Mar 2019 05:04) (14 - 18)  SpO2: 98% (16 Mar 2019 08:05) (92% - 100%)    FETAL HEART RATE   Baseline: 135  Variability: [  ] absent [  ] minimal [ x ] moderate [  ] marked  Accelerations: [ x ] present 15x15 or higher [  ] present 10x10 only  [  ] absent     DECELERATIONS:  [ x ] Absent  [  ] Early  [  ] Variable                 [  ] Late present:  [ ] </= 2 decelerations in 30 min  [ ] more than 2 decelerations in 30 min  [  ] Prolonged: [ ] present [ ] absent    UTERINE CONTRACTIONS:  [ x ] present      [  ] absent  Frequency     [  ] 3 or more in 10 minutes        [ x ] less than 3 in 10 minutes    CERVICAL EXAM:  Dilation: 2cm  Effacement: 80%  Station: -2    [ x ] AROM  [  ] SROM               [ x ] clear               [  ] meconium stained                [  ] thick meconium     [  ] IUPC     [ x ] FSE    IMPRESSION:   [  ] Normal labor course   [  ] Protracted/ Arrest in active phase   [  ] Protracted/ Arrest in second stage  FHR Category: 1  Additional:    INTERVENTIONS:  [ x ] Start Pitocin  [  ] Continue Pitocin [  ] Increase Pitocin    [  ] Decrease Pitocin   [  ] Discontinue Pitocin  Cervical Ripening:     [  ] Start Cytotec    [  ] Stop Cytotec  Additional:

## 2019-03-16 NOTE — CHART NOTE - NSCHARTNOTEFT_GEN_A_CORE
Vital Signs Last 24 Hrs  T(C): 37.2 (16 Mar 2019 17:34), Max: 37.2 (16 Mar 2019 11:28)  T(F): 98.96 (16 Mar 2019 17:34), Max: 98.96 (16 Mar 2019 11:28)  HR: 88 (16 Mar 2019 18:04) (54 - 104)  BP: 130/73 (16 Mar 2019 18:04) (107/57 - 156/70)  BP(mean): --  RR: 18 (16 Mar 2019 05:04) (16 - 18)  SpO2: 98% (16 Mar 2019 08:05) (92% - 100%)    FETAL HEART RATE   Baseline: 135  Variability: [  ] absent [  ] minimal [ x ] moderate [  ] marked  Accelerations: [ x ] present 15x15 or higher [  ] present 10x10 only  [  ] absent     DECELERATIONS:  [ x ] Absent  [  ] Early  [  ] Variable                 [  ] Late present:  [ ] </= 2 decelerations in 30 min  [ ] more than 2 decelerations in 30 min  [  ] Prolonged: [ ] present [ ] absent    UTERINE CONTRACTIONS:  [ x ] present      [  ] absent  Frequency     [ x ] 3 or more in 10 minutes        [  ] less than 3 in 10 minutes    CERVICAL EXAM:  Dilation: 10cm  Effacement: 100%  Station: 0    [  ] AROM  [  ] SROM               [ x ] clear               [  ] meconium stained                [  ] thick meconium     [  ] IUPC     [ x ] FSE    IMPRESSION:   [  ] Normal labor course   [  ] Protracted/ Arrest in active phase   [ x ] Protracted/ Arrest in second stage  FHR Category: 1   Additional:    INTERVENTIONS:  [  ] Start Pitocin  [  ] Continue Pitocin [  ] Increase Pitocin    [  ] Decrease Pitocin   [  ] Discontinue Pitocin  Cervical Ripening:     [  ] Start Cytotec    [  ] Stop Cytotec  Additional:  section

## 2019-03-17 LAB
BASOPHILS # BLD AUTO: 0 K/UL — SIGNIFICANT CHANGE UP (ref 0–0.2)
BASOPHILS NFR BLD AUTO: 0.1 % — SIGNIFICANT CHANGE UP (ref 0–2)
EOSINOPHIL # BLD AUTO: 0 K/UL — SIGNIFICANT CHANGE UP (ref 0–0.5)
EOSINOPHIL NFR BLD AUTO: 0.2 % — SIGNIFICANT CHANGE UP (ref 0–6)
HCT VFR BLD CALC: 27.6 % — LOW (ref 37–47)
HGB BLD-MCNC: 9.2 G/DL — LOW (ref 12–16)
LYMPHOCYTES # BLD AUTO: 1.2 K/UL — SIGNIFICANT CHANGE UP (ref 1–4.8)
LYMPHOCYTES # BLD AUTO: 9.5 % — LOW (ref 20–55)
MCHC RBC-ENTMCNC: 31.6 PG — HIGH (ref 27–31)
MCHC RBC-ENTMCNC: 33.3 G/DL — SIGNIFICANT CHANGE UP (ref 32–36)
MCV RBC AUTO: 94.8 FL — SIGNIFICANT CHANGE UP (ref 81–99)
MONOCYTES # BLD AUTO: 1 K/UL — HIGH (ref 0–0.8)
MONOCYTES NFR BLD AUTO: 7.9 % — SIGNIFICANT CHANGE UP (ref 3–10)
NEUTROPHILS # BLD AUTO: 10.7 K/UL — HIGH (ref 1.8–8)
NEUTROPHILS NFR BLD AUTO: 82.1 % — HIGH (ref 37–73)
PLATELET # BLD AUTO: 204 K/UL — SIGNIFICANT CHANGE UP (ref 150–400)
RBC # BLD: 2.91 M/UL — LOW (ref 4.4–5.2)
RBC # FLD: 13.8 % — SIGNIFICANT CHANGE UP (ref 11–15.6)
T PALLIDUM AB TITR SER: NEGATIVE — SIGNIFICANT CHANGE UP
WBC # BLD: 13 K/UL — HIGH (ref 4.8–10.8)
WBC # FLD AUTO: 13 K/UL — HIGH (ref 4.8–10.8)

## 2019-03-17 RX ADMIN — Medication 600 MILLIGRAM(S): at 11:17

## 2019-03-17 RX ADMIN — Medication 600 MILLIGRAM(S): at 12:00

## 2019-03-17 RX ADMIN — Medication 1 TABLET(S): at 11:12

## 2019-03-17 RX ADMIN — Medication 30 MILLIGRAM(S): at 04:32

## 2019-03-17 RX ADMIN — Medication 325 MILLIGRAM(S): at 11:12

## 2019-03-17 RX ADMIN — Medication 30 MILLIGRAM(S): at 04:47

## 2019-03-17 RX ADMIN — ENOXAPARIN SODIUM 40 MILLIGRAM(S): 100 INJECTION SUBCUTANEOUS at 20:17

## 2019-03-17 NOTE — DISCHARGE NOTE OB - PATIENT PORTAL LINK FT
You can access the [facility] Portal, offered by [facility], by registering with the following website: [email or web address]

## 2019-03-17 NOTE — DISCHARGE NOTE OB - CARE PROVIDER_API CALL
Albert Woods (DO)  Obstetrics and Gynecology  [address] [place] [address]  Phone: [phone]  Fax: [phone]  Follow Up Time:

## 2019-03-17 NOTE — PROGRESS NOTE ADULT - SUBJECTIVE AND OBJECTIVE BOX
INTERVAL HPI/OVERNIGHT EVENTS:  33y Female s/p c section under Epidural anesthesia with duramorph for post op analgesia on 03/17/2019    Vital Signs Last 16 Hrs  T(C): 36.9 (17 Mar 2019 08:38), Max: 37.2 (16 Mar 2019 11:28)  T(F): 98.4 (17 Mar 2019 08:38), Max: 98.96 (16 Mar 2019 11:28)  HR: 105 (17 Mar 2019 08:38) (68 - 105)  BP: 105/66 (17 Mar 2019 08:38) (98/52 - 156/70)  BP(mean): --  RR: 18 (17 Mar 2019 08:38) (18 - 26)  SpO2: 96% (17 Mar 2019 05:21) (96% - 100%)    Patient seen, doing well, no anesthetic complications or complaints noted or reported.  Pain is controlled. Lennon remains in situ moving lower extremities freely.

## 2019-03-17 NOTE — PROGRESS NOTE ADULT - SUBJECTIVE AND OBJECTIVE BOX
Patient is a 34yo  now POD# s/p  section    S:    No acute events overnight.  Pt seen and examined at bedside. Pt doing well.  Has no complaints.  Pain well controlled on current regiment.  Pt ambulating, tolerating PO liquids only and has not tried solid foods, Lennon to be removed this AM with followup TOV, +flatus/-BM.    O:    T(C): 36.9 (19 @ 05:21), Max: 37.2 (19 @ 11:28)  HR: 89 (19 @ 05:21) (68 - 104)  BP: 114/67 (19 @ 05:21) (98/52 - 156/70)  RR: 18 (19 @ 05:21) (18 - 26)  SpO2: 96% (19 @ 05:21) (92% - 100%)  Wt(kg): --    Physical Exam  Breast: NT, non-engorged  Abdomen:  soft, NT, ND, BS+, bandage dressing removed, incision c/d/i  Uterus:  firm below umbilicus  VE:  expectant lochia  Ext:  NT, nonedematous                          11.6   9.1   )-----------( 242      ( 15 Mar 2019 13:18 )             34.3             A/P:  Patient is a 34yo  now POD# s/p  section  -Stable  -TOV this AM, tolerating PO liquids and will attempt to try solids today, bowel function nml   -Advance care as tolerated   -Continue routine postpartum/postoperative care and education.  -DVT: Lovenox + SCDs  -Pt encouraged to increase ambulation and PO intake.  -D/C POD 2,3,4 if meeting all expected milestones.

## 2019-03-17 NOTE — DISCHARGE NOTE OB - HOSPITAL COURSE
Pt is 32yo  s/p  section complicated by post-partum hemorrhage. She was transferred to postpartum unit without complications during her stay. Upon discharge she is voiding, tolerating PO, ambulating, and pain is well controlled.

## 2019-03-17 NOTE — DISCHARGE NOTE OB - MEDICATION SUMMARY - MEDICATIONS TO TAKE
I will START or STAY ON the medications listed below when I get home from the hospital:    ibuprofen 600 mg oral tablet  -- 1 tab(s) by mouth every 6 hours, As needed, Mild Pain (1 - 3)  -- Indication: For mild pain    Prenatal Multivitamins with Folic Acid 1 mg oral tablet  -- 1 tab(s) by mouth once a day  -- Indication: For vitamin    docusate sodium 100 mg oral capsule  -- 1 cap(s) by mouth 2 times a day, As needed, Stool Softening  -- Indication: For constipation

## 2019-03-18 RX ORDER — ACETAMINOPHEN 500 MG
650 TABLET ORAL EVERY 6 HOURS
Qty: 0 | Refills: 0 | Status: DISCONTINUED | OUTPATIENT
Start: 2019-03-18 | End: 2019-03-20

## 2019-03-18 RX ADMIN — Medication 600 MILLIGRAM(S): at 13:30

## 2019-03-18 RX ADMIN — Medication 600 MILLIGRAM(S): at 07:08

## 2019-03-18 RX ADMIN — Medication 650 MILLIGRAM(S): at 04:26

## 2019-03-18 RX ADMIN — Medication 600 MILLIGRAM(S): at 20:15

## 2019-03-18 RX ADMIN — Medication 600 MILLIGRAM(S): at 12:37

## 2019-03-18 RX ADMIN — ENOXAPARIN SODIUM 40 MILLIGRAM(S): 100 INJECTION SUBCUTANEOUS at 20:32

## 2019-03-18 RX ADMIN — SIMETHICONE 80 MILLIGRAM(S): 80 TABLET, CHEWABLE ORAL at 00:59

## 2019-03-18 RX ADMIN — Medication 600 MILLIGRAM(S): at 19:19

## 2019-03-18 RX ADMIN — Medication 650 MILLIGRAM(S): at 03:33

## 2019-03-18 RX ADMIN — Medication 325 MILLIGRAM(S): at 12:37

## 2019-03-18 RX ADMIN — Medication 600 MILLIGRAM(S): at 01:48

## 2019-03-18 RX ADMIN — Medication 600 MILLIGRAM(S): at 00:51

## 2019-03-18 RX ADMIN — Medication 600 MILLIGRAM(S): at 06:31

## 2019-03-18 RX ADMIN — Medication 1 TABLET(S): at 12:37

## 2019-03-18 NOTE — PROGRESS NOTE ADULT - ASSESSMENT
33y year old  s/p primary  for arrest of descent at 39.3 wks gestation POD#2.     -Stable  -Voiding, tolerating PO, bowel function nml   -Advance care as tolerated   -Continue routine postpartum/postoperative care and education.  -Pt recovering well, meeting expected day 2 milestones  -Encouraged ambulation   -DVT: Lovenox + SCDs

## 2019-03-18 NOTE — PROGRESS NOTE ADULT - SUBJECTIVE AND OBJECTIVE BOX
33y year old  s/p primary  for arrest of descent at 39.3 wks gestation POD#2.   Patient seen and examined at bedside, no acute overnight events.   Patient is ambulating, +eating, +PO hydration, +voiding, +Flatus, -BM, +Formula feeding, +Breast feeding and pain is well controlled.  Denies headache, SOB, fever, chills and calf pain.     Vital Signs Last 24 Hrs  T(F): 98.5 (17 Mar 2019 20:19), Max: 98.5 (17 Mar 2019 20:19)  HR: 107 (17 Mar 2019 20:19) (105 - 107)  BP: 97/62 (17 Mar 2019 20:19) (97/62 - 108/62)  RR: 20 (17 Mar 2019 20:19) (18 - 20)  SpO2: 99% (17 Mar 2019 20:19) (99% - 99%)    Physical Exam:  General: NAD  Abdomen:  soft, ND, minimally tender, Fundus firm at level of umbilicus. Suture/Staples noted, clean incision site, healing well. Dressing clean, dry, intact.  Pelvic: Minimal lochia  Ext: No cyanosis, edema or calf tenderness.     Labs:                        9.2    13.0  )-----------( 204      ( 17 Mar 2019 07:05 )             27.6       Medication:  MEDICATIONS  (STANDING):  acetaminophen  IVPB .. 1000 milliGRAM(s) IV Intermittent once  diphtheria/tetanus/pertussis (acellular) Vaccine (ADAcel) 0.5 milliLiter(s) IntraMuscular once  enoxaparin Injectable 40 milliGRAM(s) SubCutaneous every 24 hours  ferrous    sulfate 325 milliGRAM(s) Oral daily  lactated ringers. 1000 milliLiter(s) (125 mL/Hr) IV Continuous <Continuous>  oxytocin Infusion 41.667 milliUNIT(s)/Min (125 mL/Hr) IV Continuous <Continuous>  prenatal multivitamin 1 Tablet(s) Oral daily    MEDICATIONS  (PRN):  acetaminophen   Tablet .. 650 milliGRAM(s) Oral every 6 hours PRN Mild Pain (1 - 3), Moderate Pain (4 - 6)  diphenhydrAMINE 25 milliGRAM(s) Oral every 4 hours PRN Pruritus  diphenhydrAMINE 25 milliGRAM(s) Oral every 6 hours PRN Itching  diphenhydrAMINE   Injectable 25 milliGRAM(s) IV Push once PRN Rash and/or Itching  docusate sodium 100 milliGRAM(s) Oral two times a day PRN Stool Softening  glycerin Suppository - Adult 1 Suppository(s) Rectal at bedtime PRN Constipation  ibuprofen  Tablet. 600 milliGRAM(s) Oral every 6 hours PRN Mild Pain (1 - 3)  lanolin Ointment 1 Application(s) Topical every 3 hours PRN Sore Nipples  nalbuphine Injectable 2.5 milliGRAM(s) IV Push every 6 hours PRN Pruritus  naloxone Injectable 0.1 milliGRAM(s) IV Push every 3 minutes PRN For ANY of the following changes in patient status:  A. RR LESS THAN 10 breaths per minute, B. Oxygen saturation LESS THAN 90%, C. Sedation score of 6  ondansetron Injectable 4 milliGRAM(s) IV Push every 6 hours PRN Nausea  simethicone 80 milliGRAM(s) Chew every 4 hours PRN Gas

## 2019-03-18 NOTE — PROGRESS NOTE ADULT - SUBJECTIVE AND OBJECTIVE BOX
POD #2    S: patient doing well, no complaints, tolerating diet, pain controlled    O: Vital Signs Last 24 Hrs  T(C): 36.9 (17 Mar 2019 20:19), Max: 36.9 (17 Mar 2019 08:38)  T(F): 98.5 (17 Mar 2019 20:19), Max: 98.5 (17 Mar 2019 20:19)  HR: 107 (17 Mar 2019 20:19) (105 - 107)  BP: 97/62 (17 Mar 2019 20:19) (97/62 - 108/62)  BP(mean): --  RR: 20 (17 Mar 2019 20:19) (18 - 20)  SpO2: 99% (17 Mar 2019 20:19) (99% - 99%)         lungs - clear to auscultation bilaterally       breasts - not engorged       abdomen - soft, nontender, + bowel sounds       incision - clean, dry, intact       fundus - firm       lochia - minimal       extremities - no calf tenderness                            9.2    13.0  )-----------( 204      ( 17 Mar 2019 07:05 )             27.6               A: POD #2 S/P C/S    P: continue present management

## 2019-03-19 RX ORDER — IBUPROFEN 200 MG
1 TABLET ORAL
Qty: 28 | Refills: 0
Start: 2019-03-19 | End: 2019-03-25

## 2019-03-19 RX ORDER — DOCUSATE SODIUM 100 MG
1 CAPSULE ORAL
Qty: 60 | Refills: 0 | OUTPATIENT
Start: 2019-03-19 | End: 2019-04-17

## 2019-03-19 RX ADMIN — Medication 1 TABLET(S): at 17:44

## 2019-03-19 RX ADMIN — Medication 600 MILLIGRAM(S): at 18:43

## 2019-03-19 RX ADMIN — ENOXAPARIN SODIUM 40 MILLIGRAM(S): 100 INJECTION SUBCUTANEOUS at 20:05

## 2019-03-19 RX ADMIN — Medication 600 MILLIGRAM(S): at 13:18

## 2019-03-19 RX ADMIN — Medication 600 MILLIGRAM(S): at 19:40

## 2019-03-19 RX ADMIN — Medication 600 MILLIGRAM(S): at 04:00

## 2019-03-19 RX ADMIN — Medication 600 MILLIGRAM(S): at 03:04

## 2019-03-19 RX ADMIN — Medication 600 MILLIGRAM(S): at 12:48

## 2019-03-19 RX ADMIN — Medication 325 MILLIGRAM(S): at 12:48

## 2019-03-19 NOTE — PROGRESS NOTE ADULT - ASSESSMENT
33y year old  s/p primary  for arrest of descent at 39.3 wks gestation POD#3.     -Stable  -Voiding, tolerating PO, bowel function nml   -Advance care as tolerated  -Continue routine postpartum/postoperative care and education.  -Pt recovering well, meeting expected day 3 milestones  -Encouraged ambulation   -DVT: Lovenox + SCDs

## 2019-03-19 NOTE — PROGRESS NOTE ADULT - SUBJECTIVE AND OBJECTIVE BOX
POD #3    S: patient doing well, no complaints, tolerating diet, pain controlled    O: Vital Signs Last 24 Hrs  T(C): 36.8 (18 Mar 2019 20:15), Max: 36.8 (18 Mar 2019 20:15)  T(F): 98.2 (18 Mar 2019 20:15), Max: 98.2 (18 Mar 2019 20:15)  HR: 100 (18 Mar 2019 20:15) (100 - 100)  BP: 98/67 (18 Mar 2019 20:15) (98/67 - 98/67)  BP(mean): --  RR: 20 (18 Mar 2019 20:15) (20 - 20)  SpO2: --         lungs - clear to auscultation bilaterally       breasts - not engorged       abdomen - soft, nontender, + bowel sounds       incision - clean, dry, intact       fundus - firm       lochia - minimal       extremities - no calf tenderness                  A: POD #3 S/P C/S    P: continue present management

## 2019-03-19 NOTE — PROGRESS NOTE ADULT - SUBJECTIVE AND OBJECTIVE BOX
33y year old  s/p primary  for arrest of descent at 39.3 wks gestation POD#3.   Patient seen and examined at bedside, no acute overnight events.   Patient is ambulating, +eating, +PO hydration, +voiding, +Flatus, -BM, +Formula feeding, +Breast feeding and pain is well controlled.  Denies headache, SOB, fever, chills and calf pain.     Vital Signs Last 24 Hrs  T(F): 98.2 (18 Mar 2019 20:15), Max: 98.2 (18 Mar 2019 20:15)  HR: 100 (18 Mar 2019 20:15) (100 - 100)  BP: 98/67 (18 Mar 2019 20:15) (98/67 - 98/67)  RR: 20 (18 Mar 2019 20:15) (20 - 20)    Physical Exam:  General: NAD  Abdomen:  soft, ND, minimally tender, Fundus firm at level of umbilicus. Sutures noted, clean incision site, healing well.   Pelvic: Minimal lochia  Ext: No cyanosis, edema or calf tenderness.     Labs:                        9.2    13.0  )-----------( 204      ( 17 Mar 2019 07:05 )             27.6       Medication:  MEDICATIONS  (STANDING):  acetaminophen  IVPB .. 1000 milliGRAM(s) IV Intermittent once  diphtheria/tetanus/pertussis (acellular) Vaccine (ADAcel) 0.5 milliLiter(s) IntraMuscular once  enoxaparin Injectable 40 milliGRAM(s) SubCutaneous every 24 hours  ferrous    sulfate 325 milliGRAM(s) Oral daily  lactated ringers. 1000 milliLiter(s) (125 mL/Hr) IV Continuous <Continuous>  oxytocin Infusion 41.667 milliUNIT(s)/Min (125 mL/Hr) IV Continuous <Continuous>  prenatal multivitamin 1 Tablet(s) Oral daily    MEDICATIONS  (PRN):  acetaminophen   Tablet .. 650 milliGRAM(s) Oral every 6 hours PRN Mild Pain (1 - 3), Moderate Pain (4 - 6)  diphenhydrAMINE 25 milliGRAM(s) Oral every 4 hours PRN Pruritus  diphenhydrAMINE 25 milliGRAM(s) Oral every 6 hours PRN Itching  diphenhydrAMINE   Injectable 25 milliGRAM(s) IV Push once PRN Rash and/or Itching  docusate sodium 100 milliGRAM(s) Oral two times a day PRN Stool Softening  glycerin Suppository - Adult 1 Suppository(s) Rectal at bedtime PRN Constipation  ibuprofen  Tablet. 600 milliGRAM(s) Oral every 6 hours PRN Mild Pain (1 - 3)  lanolin Ointment 1 Application(s) Topical every 3 hours PRN Sore Nipples  nalbuphine Injectable 2.5 milliGRAM(s) IV Push every 6 hours PRN Pruritus  naloxone Injectable 0.1 milliGRAM(s) IV Push every 3 minutes PRN For ANY of the following changes in patient status:  A. RR LESS THAN 10 breaths per minute, B. Oxygen saturation LESS THAN 90%, C. Sedation score of 6  ondansetron Injectable 4 milliGRAM(s) IV Push every 6 hours PRN Nausea  simethicone 80 milliGRAM(s) Chew every 4 hours PRN Gas

## 2019-03-20 VITALS
TEMPERATURE: 98 F | DIASTOLIC BLOOD PRESSURE: 80 MMHG | SYSTOLIC BLOOD PRESSURE: 127 MMHG | RESPIRATION RATE: 20 BRPM | HEART RATE: 80 BPM

## 2019-03-20 PROCEDURE — 86850 RBC ANTIBODY SCREEN: CPT

## 2019-03-20 PROCEDURE — 82962 GLUCOSE BLOOD TEST: CPT

## 2019-03-20 PROCEDURE — 81003 URINALYSIS AUTO W/O SCOPE: CPT

## 2019-03-20 PROCEDURE — C1889: CPT

## 2019-03-20 PROCEDURE — 86880 COOMBS TEST DIRECT: CPT

## 2019-03-20 PROCEDURE — 59025 FETAL NON-STRESS TEST: CPT

## 2019-03-20 PROCEDURE — 86780 TREPONEMA PALLIDUM: CPT

## 2019-03-20 PROCEDURE — 59050 FETAL MONITOR W/REPORT: CPT

## 2019-03-20 PROCEDURE — G0463: CPT

## 2019-03-20 PROCEDURE — 85027 COMPLETE CBC AUTOMATED: CPT

## 2019-03-20 PROCEDURE — 86901 BLOOD TYPING SEROLOGIC RH(D): CPT

## 2019-03-20 PROCEDURE — 86900 BLOOD TYPING SEROLOGIC ABO: CPT

## 2019-03-20 PROCEDURE — 36415 COLL VENOUS BLD VENIPUNCTURE: CPT

## 2019-03-20 RX ADMIN — Medication 600 MILLIGRAM(S): at 05:04

## 2019-03-20 RX ADMIN — Medication 600 MILLIGRAM(S): at 04:12

## 2019-03-20 NOTE — PROGRESS NOTE ADULT - SUBJECTIVE AND OBJECTIVE BOX
33y  s/p pCS at 39 3/7 wks gestation for arrest of descent PPD#4.   Patient seen and examined at bedside, no acute overnight events.   Patient is ambulating, +eating, +PO hydration, +voiding, +Flatus, +BM, +Formula feeding, +Breast feeding and pain is well controlled.  Denies headache, SOB, fever, chills and calf pain.    VS:   Vital Signs Last 24 Hrs  T(C): 36.8 (19 Mar 2019 19:25), Max: 36.8 (19 Mar 2019 19:25)  T(F): 98.3 (19 Mar 2019 19:25), Max: 98.3 (19 Mar 2019 19:25)  HR: 93 (19 Mar 2019 19:25) (87 - 93)  BP: 128/82 (19 Mar 2019 19:25) (120/78 - 128/82)  RR: 20 (19 Mar 2019 19:25) (20 - 20)  SpO2: 99% (19 Mar 2019 19:25) (99% - 99%)    Physical Exam:  General: NAD  CVS: RRR, +S1/S2  Lungs: CTAB, no wheeze, ronchi or rales.   Abdomen: +BS, soft, ND, minimally tender, Fundus firm at level of umbilicus, steri strips c/d/i  Pelvic: Minimal lochia  Ext: No cyanosis, edema or calf tenderness.       Medication:  MEDICATIONS  (STANDING):  acetaminophen  IVPB .. 1000 milliGRAM(s) IV Intermittent once  diphtheria/tetanus/pertussis (acellular) Vaccine (ADAcel) 0.5 milliLiter(s) IntraMuscular once  enoxaparin Injectable 40 milliGRAM(s) SubCutaneous every 24 hours  ferrous    sulfate 325 milliGRAM(s) Oral daily  lactated ringers. 1000 milliLiter(s) (125 mL/Hr) IV Continuous <Continuous>  oxytocin Infusion 41.667 milliUNIT(s)/Min (125 mL/Hr) IV Continuous <Continuous>  prenatal multivitamin 1 Tablet(s) Oral daily    MEDICATIONS  (PRN):  acetaminophen   Tablet .. 650 milliGRAM(s) Oral every 6 hours PRN Mild Pain (1 - 3), Moderate Pain (4 - 6)  diphenhydrAMINE 25 milliGRAM(s) Oral every 4 hours PRN Pruritus  diphenhydrAMINE 25 milliGRAM(s) Oral every 6 hours PRN Itching  diphenhydrAMINE   Injectable 25 milliGRAM(s) IV Push once PRN Rash and/or Itching  docusate sodium 100 milliGRAM(s) Oral two times a day PRN Stool Softening  glycerin Suppository - Adult 1 Suppository(s) Rectal at bedtime PRN Constipation  ibuprofen  Tablet. 600 milliGRAM(s) Oral every 6 hours PRN Mild Pain (1 - 3)  lanolin Ointment 1 Application(s) Topical every 3 hours PRN Sore Nipples  nalbuphine Injectable 2.5 milliGRAM(s) IV Push every 6 hours PRN Pruritus  naloxone Injectable 0.1 milliGRAM(s) IV Push every 3 minutes PRN For ANY of the following changes in patient status:  A. RR LESS THAN 10 breaths per minute, B. Oxygen saturation LESS THAN 90%, C. Sedation score of 6  ondansetron Injectable 4 milliGRAM(s) IV Push every 6 hours PRN Nausea  simethicone 80 milliGRAM(s) Chew every 4 hours PRN Gas 33y  s/p pCS at 39 3/7 wks gestation for arrest of descent PPD#4.   Patient seen and examined at bedside, no acute overnight events.   Patient is ambulating, +eating, +PO hydration, +voiding, +Flatus, +BM, +Formula feeding, +Breast feeding and pain is well controlled.  Denies headache, SOB, fever, chills and calf pain.    VS:   Vital Signs Last 24 Hrs  T(C): 36.8 (19 Mar 2019 19:25), Max: 36.8 (19 Mar 2019 19:25)  T(F): 98.3 (19 Mar 2019 19:25), Max: 98.3 (19 Mar 2019 19:25)  HR: 93 (19 Mar 2019 19:25) (87 - 93)  BP: 128/82 (19 Mar 2019 19:25) (120/78 - 128/82)  RR: 20 (19 Mar 2019 19:25) (20 - 20)  SpO2: 99% (19 Mar 2019 19:25) (99% - 99%)    Physical Exam:  General: NAD  CVS: RRR, +S1/S2  Lungs: CTAB, no wheeze, ronchi or rales.   Abdomen: +BS, soft, ND, minimally tender, Fundus firm at level of umbilicus, steri strips c/d/i  Pelvic: Minimal lochia  Ext: No cyanosis or calf tenderness. + Edema b/l       Medication:  MEDICATIONS  (STANDING):  acetaminophen  IVPB .. 1000 milliGRAM(s) IV Intermittent once  diphtheria/tetanus/pertussis (acellular) Vaccine (ADAcel) 0.5 milliLiter(s) IntraMuscular once  enoxaparin Injectable 40 milliGRAM(s) SubCutaneous every 24 hours  ferrous    sulfate 325 milliGRAM(s) Oral daily  lactated ringers. 1000 milliLiter(s) (125 mL/Hr) IV Continuous <Continuous>  oxytocin Infusion 41.667 milliUNIT(s)/Min (125 mL/Hr) IV Continuous <Continuous>  prenatal multivitamin 1 Tablet(s) Oral daily    MEDICATIONS  (PRN):  acetaminophen   Tablet .. 650 milliGRAM(s) Oral every 6 hours PRN Mild Pain (1 - 3), Moderate Pain (4 - 6)  diphenhydrAMINE 25 milliGRAM(s) Oral every 4 hours PRN Pruritus  diphenhydrAMINE 25 milliGRAM(s) Oral every 6 hours PRN Itching  diphenhydrAMINE   Injectable 25 milliGRAM(s) IV Push once PRN Rash and/or Itching  docusate sodium 100 milliGRAM(s) Oral two times a day PRN Stool Softening  glycerin Suppository - Adult 1 Suppository(s) Rectal at bedtime PRN Constipation  ibuprofen  Tablet. 600 milliGRAM(s) Oral every 6 hours PRN Mild Pain (1 - 3)  lanolin Ointment 1 Application(s) Topical every 3 hours PRN Sore Nipples  nalbuphine Injectable 2.5 milliGRAM(s) IV Push every 6 hours PRN Pruritus  naloxone Injectable 0.1 milliGRAM(s) IV Push every 3 minutes PRN For ANY of the following changes in patient status:  A. RR LESS THAN 10 breaths per minute, B. Oxygen saturation LESS THAN 90%, C. Sedation score of 6  ondansetron Injectable 4 milliGRAM(s) IV Push every 6 hours PRN Nausea  simethicone 80 milliGRAM(s) Chew every 4 hours PRN Gas

## 2019-03-20 NOTE — PROGRESS NOTE ADULT - SUBJECTIVE AND OBJECTIVE BOX
POD #4     S: patient doing well, no complaints, tolerating diet, pain controlled    O: Vital Signs Last 24 Hrs  T(C): 36.8 (20 Mar 2019 09:07), Max: 36.8 (19 Mar 2019 19:25)  T(F): 98.2 (20 Mar 2019 09:07), Max: 98.3 (19 Mar 2019 19:25)  HR: 80 (20 Mar 2019 09:07) (80 - 93)  BP: 127/80 (20 Mar 2019 09:07) (127/80 - 128/82)  BP(mean): --  RR: 20 (20 Mar 2019 09:07) (20 - 20)  SpO2: 99% (19 Mar 2019 19:25) (99% - 99%)         lungs - clear to auscultation bilaterally       breasts - not engorged       abdomen - soft, nontender, + bowel sounds       incision - clean, dry, intact       fundus - firm       lochia - minimal       extremities - no calf tenderness                  A: POD #4 S/P C/S    P: discharge home      return to the office in 2 weeks

## 2019-03-20 NOTE — PROGRESS NOTE ADULT - ASSESSMENT
33y  s/p pCS at 39 3/7 wks gestation for arrest of descent PPD#4.     1.  Delivery   -Patient is feeling well and hemodynamically stable, meeting expected milestones   -Encourage breast feeding and mother-to-baby interaction   -Tolerating PO, voiding and bowel function nml   -C/w pain management PRN  -Encourage ambulation  -DVT ppx: Lovenox and SCDs  -c/w routine postpartum care and education  -Plan for d/c on 3/20/19, pending attending's approval

## 2019-03-21 ENCOUNTER — APPOINTMENT (OUTPATIENT)
Dept: ANTEPARTUM | Facility: CLINIC | Age: 34
End: 2019-03-21

## 2019-03-21 ENCOUNTER — APPOINTMENT (OUTPATIENT)
Age: 34
End: 2019-03-21

## 2019-09-30 PROBLEM — K21.9 GASTRO-ESOPHAGEAL REFLUX DISEASE WITHOUT ESOPHAGITIS: Chronic | Status: ACTIVE | Noted: 2019-03-15

## 2019-09-30 PROBLEM — O24.410 GESTATIONAL DIABETES MELLITUS IN PREGNANCY, DIET CONTROLLED: Chronic | Status: ACTIVE | Noted: 2019-03-15

## 2019-09-30 PROBLEM — J45.909 UNSPECIFIED ASTHMA, UNCOMPLICATED: Chronic | Status: ACTIVE | Noted: 2019-03-15

## 2019-09-30 PROBLEM — I10 ESSENTIAL (PRIMARY) HYPERTENSION: Chronic | Status: ACTIVE | Noted: 2019-03-15

## 2019-10-07 ENCOUNTER — APPOINTMENT (OUTPATIENT)
Dept: OBGYN | Facility: CLINIC | Age: 34
End: 2019-10-07
Payer: COMMERCIAL

## 2019-10-07 VITALS
DIASTOLIC BLOOD PRESSURE: 88 MMHG | WEIGHT: 199.44 LBS | HEIGHT: 61 IN | SYSTOLIC BLOOD PRESSURE: 140 MMHG | BODY MASS INDEX: 37.65 KG/M2

## 2019-10-07 DIAGNOSIS — Z86.59 PERSONAL HISTORY OF OTHER MENTAL AND BEHAVIORAL DISORDERS: ICD-10-CM

## 2019-10-07 DIAGNOSIS — Z80.41 FAMILY HISTORY OF MALIGNANT NEOPLASM OF OVARY: ICD-10-CM

## 2019-10-07 DIAGNOSIS — Z86.32 PERSONAL HISTORY OF GESTATIONAL DIABETES: ICD-10-CM

## 2019-10-07 DIAGNOSIS — Z82.49 FAMILY HISTORY OF ISCHEMIC HEART DISEASE AND OTHER DISEASES OF THE CIRCULATORY SYSTEM: ICD-10-CM

## 2019-10-07 DIAGNOSIS — Z87.19 PERSONAL HISTORY OF OTHER DISEASES OF THE DIGESTIVE SYSTEM: ICD-10-CM

## 2019-10-07 DIAGNOSIS — Z87.891 PERSONAL HISTORY OF NICOTINE DEPENDENCE: ICD-10-CM

## 2019-10-07 PROCEDURE — 99213 OFFICE O/P EST LOW 20 MIN: CPT

## 2019-10-07 RX ORDER — BLOOD SUGAR DIAGNOSTIC
STRIP MISCELLANEOUS
Qty: 150 | Refills: 2 | Status: DISCONTINUED | COMMUNITY
Start: 2019-01-16 | End: 2019-10-07

## 2019-10-07 RX ORDER — CALCIUM CARB/VITAMIN D3/VIT K1 500-500-40
TABLET,CHEWABLE ORAL
Qty: 150 | Refills: 0 | Status: DISCONTINUED | COMMUNITY
Start: 2019-01-16 | End: 2019-10-07

## 2019-10-07 RX ORDER — URINE ACETONE TEST STRIPS
STRIP MISCELLANEOUS
Qty: 2 | Refills: 0 | Status: DISCONTINUED | COMMUNITY
Start: 2019-01-16 | End: 2019-10-07

## 2019-10-07 NOTE — HISTORY OF PRESENT ILLNESS
[Normal Amount/Duration] : was of a normal amount and duration [Regular Cycle Intervals] : periods have been regular [Frequency: Q ___ days] : menstrual periods occur approximately every [unfilled] days [Menarche Age: ____] : age at menarche was [unfilled] [Sexually Active] : is sexually active [Lower-Lt-Q] : left lower quadrant [Burning] : burning [Continuous] : continuous [Spotting Between  Menses] : no spotting between menses [Sharp] : sharp [Contraception] : does not use contraception

## 2019-10-10 LAB — HPV HIGH+LOW RISK DNA PNL CVX: NOT DETECTED

## 2019-10-12 PROBLEM — R39.89 POSSIBLE URINARY TRACT INFECTION: Status: RESOLVED | Noted: 2019-10-12 | Resolved: 2019-10-12

## 2019-10-12 PROBLEM — Z87.09 HISTORY OF ASTHMA: Status: RESOLVED | Noted: 2019-10-12 | Resolved: 2019-10-12

## 2019-10-12 PROBLEM — Z82.49 FAMILY HISTORY OF CARDIAC DISORDER: Status: ACTIVE | Noted: 2019-10-07

## 2019-10-12 PROBLEM — Z87.42 HISTORY OF IRREGULAR MENSTRUAL CYCLES: Status: RESOLVED | Noted: 2019-10-12 | Resolved: 2019-10-12

## 2019-10-12 PROBLEM — Z80.8 FAMILY HISTORY OF MALIGNANT NEOPLASM OF SKIN: Status: ACTIVE | Noted: 2019-10-12

## 2019-10-12 PROBLEM — Z31.69 ENCOUNTER FOR PRECONCEPTION CONSULTATION: Status: RESOLVED | Noted: 2019-10-12 | Resolved: 2019-10-12

## 2019-10-12 PROBLEM — Z86.59 HISTORY OF ANXIETY DISORDER: Status: RESOLVED | Noted: 2019-10-12 | Resolved: 2019-10-12

## 2019-10-12 LAB
CYTOLOGY CVX/VAG DOC THIN PREP: NEGATIVE
CYTOLOGY CVX/VAG DOC THIN PREP: NEGATIVE

## 2019-10-14 ENCOUNTER — APPOINTMENT (OUTPATIENT)
Dept: OBGYN | Facility: CLINIC | Age: 34
End: 2019-10-14
Payer: COMMERCIAL

## 2019-10-14 VITALS
HEIGHT: 61 IN | WEIGHT: 200.56 LBS | DIASTOLIC BLOOD PRESSURE: 84 MMHG | BODY MASS INDEX: 37.86 KG/M2 | SYSTOLIC BLOOD PRESSURE: 120 MMHG

## 2019-10-14 DIAGNOSIS — Z82.49 FAMILY HISTORY OF ISCHEMIC HEART DISEASE AND OTHER DISEASES OF THE CIRCULATORY SYSTEM: ICD-10-CM

## 2019-10-14 DIAGNOSIS — Z86.59 PERSONAL HISTORY OF OTHER MENTAL AND BEHAVIORAL DISORDERS: ICD-10-CM

## 2019-10-14 DIAGNOSIS — R39.89 OTHER SYMPTOMS AND SIGNS INVOLVING THE GENITOURINARY SYSTEM: ICD-10-CM

## 2019-10-14 DIAGNOSIS — Z80.8 FAMILY HISTORY OF MALIGNANT NEOPLASM OF OTHER ORGANS OR SYSTEMS: ICD-10-CM

## 2019-10-14 DIAGNOSIS — Z87.42 PERSONAL HISTORY OF OTHER DISEASES OF THE FEMALE GENITAL TRACT: ICD-10-CM

## 2019-10-14 DIAGNOSIS — Z31.69 ENCOUNTER FOR OTHER GENERAL COUNSELING AND ADVICE ON PROCREATION: ICD-10-CM

## 2019-10-14 DIAGNOSIS — Z87.09 PERSONAL HISTORY OF OTHER DISEASES OF THE RESPIRATORY SYSTEM: ICD-10-CM

## 2019-10-14 LAB — CYTOLOGY CVX/VAG DOC THIN PREP: NORMAL

## 2019-10-14 PROCEDURE — 99213 OFFICE O/P EST LOW 20 MIN: CPT

## 2019-10-14 NOTE — CHIEF COMPLAINT
[Follow Up] : follow up GYN visit [FreeTextEntry1] : The patient denies any pelvic pain at this time.

## 2020-05-20 NOTE — OB RN DELIVERY SUMMARY - NS_GBSABX_OBGYN_ALL_OB
Me   to PING Wang    5/19/2020 3:34 PM     Hey,     You refilled Gayes norco on 5/11 (30 tabs, 1 tab every 8 hours) but she thinks someone is taking them from her. The home nurse was at her home today and said the pill bottle was empty (even though there were some left on Friday when she was there). We told her the last refill would be the last one from our office but given the circumstances, wasn't sure if you had any specific recommendations. Patient previously declined pain management referral.     Thanks,   Radha   Yes

## 2020-11-16 ENCOUNTER — APPOINTMENT (OUTPATIENT)
Dept: OBGYN | Facility: CLINIC | Age: 35
End: 2020-11-16
Payer: COMMERCIAL

## 2020-11-16 VITALS
DIASTOLIC BLOOD PRESSURE: 70 MMHG | BODY MASS INDEX: 38.33 KG/M2 | WEIGHT: 203 LBS | HEIGHT: 61 IN | TEMPERATURE: 97.9 F | SYSTOLIC BLOOD PRESSURE: 120 MMHG

## 2020-11-16 DIAGNOSIS — Z01.419 ENCOUNTER FOR GYNECOLOGICAL EXAMINATION (GENERAL) (ROUTINE) W/OUT ABNORMAL FINDINGS: ICD-10-CM

## 2020-11-16 PROCEDURE — 99395 PREV VISIT EST AGE 18-39: CPT

## 2020-11-16 NOTE — HISTORY OF PRESENT ILLNESS
[N] : Patient does not use contraception [Y] : Positive pregnancy history [Regular Cycle Intervals] : periods have been regular [Frequency: Q ___ days] : menstrual periods occur approximately every [unfilled] days [Menarche Age: ____] : age at menarche was [unfilled] [PGxTotal] : 1 [[facility]] : 1 [PGHxPremature] : 0 [PGHxAbortions] : 0 [[facility] Centeriving] : 1 [PGHxABInduced] : 0 [PGHxABSpont] : 0 [PGHxEctopic] : 0 [PGHxMultBirths] : 0

## 2020-11-18 LAB — HPV HIGH+LOW RISK DNA PNL CVX: NOT DETECTED

## 2020-11-19 LAB — CYTOLOGY CVX/VAG DOC THIN PREP: NORMAL

## 2020-12-23 PROBLEM — Z01.419 ENCOUNTER FOR GYNECOLOGICAL EXAMINATION: Status: RESOLVED | Noted: 2020-11-16 | Resolved: 2020-12-23

## 2021-01-05 ENCOUNTER — TRANSCRIPTION ENCOUNTER (OUTPATIENT)
Age: 36
End: 2021-01-05

## 2021-02-12 ENCOUNTER — TRANSCRIPTION ENCOUNTER (OUTPATIENT)
Age: 36
End: 2021-02-12

## 2021-12-29 ENCOUNTER — NON-APPOINTMENT (OUTPATIENT)
Age: 36
End: 2021-12-29

## 2021-12-29 ENCOUNTER — APPOINTMENT (OUTPATIENT)
Dept: OBGYN | Facility: CLINIC | Age: 36
End: 2021-12-29
Payer: COMMERCIAL

## 2021-12-29 VITALS
SYSTOLIC BLOOD PRESSURE: 126 MMHG | WEIGHT: 210 LBS | BODY MASS INDEX: 39.65 KG/M2 | DIASTOLIC BLOOD PRESSURE: 72 MMHG | HEIGHT: 61 IN

## 2021-12-29 DIAGNOSIS — B96.89 ACUTE VAGINITIS: ICD-10-CM

## 2021-12-29 DIAGNOSIS — N76.0 ACUTE VAGINITIS: ICD-10-CM

## 2021-12-29 PROCEDURE — 99213 OFFICE O/P EST LOW 20 MIN: CPT

## 2021-12-29 NOTE — CHIEF COMPLAINT
[Urgent Visit] : Urgent Visit [FreeTextEntry1] : Tthe patient presents complaining of foul smelling vaginal discharge.

## 2021-12-29 NOTE — PHYSICAL EXAM
[Chaperone Present] : A chaperone was present in the examining room during all aspects of the physical examination [Awake] : awake [Alert] : alert [Acute Distress] : no acute distress [Soft] : soft [Tender] : non tender [Oriented x3] : oriented to person, place, and time [Normal] : uterus [Discharge] : a  ~M vaginal discharge was present [Moderate] : moderate [Foul Smelling] : foul smelling [No Bleeding] : there was no active vaginal bleeding [Uterine Adnexae] : were not tender and not enlarged

## 2022-01-03 LAB
CANDIDA VAG CYTO: NOT DETECTED
G VAGINALIS+PREV SP MTYP VAG QL MICRO: NOT DETECTED
T VAGINALIS VAG QL WET PREP: NOT DETECTED

## 2022-01-10 ENCOUNTER — APPOINTMENT (OUTPATIENT)
Dept: OBGYN | Facility: CLINIC | Age: 37
End: 2022-01-10
Payer: COMMERCIAL

## 2022-01-10 VITALS
WEIGHT: 211 LBS | DIASTOLIC BLOOD PRESSURE: 78 MMHG | BODY MASS INDEX: 39.84 KG/M2 | HEIGHT: 61 IN | SYSTOLIC BLOOD PRESSURE: 120 MMHG

## 2022-01-10 DIAGNOSIS — Z01.419 ENCOUNTER FOR GYNECOLOGICAL EXAMINATION (GENERAL) (ROUTINE) W/OUT ABNORMAL FINDINGS: ICD-10-CM

## 2022-01-10 PROCEDURE — 99395 PREV VISIT EST AGE 18-39: CPT

## 2022-01-10 NOTE — PHYSICAL EXAM
The ECG revealed an A-V block. The A-V block is 1st degree. The ECG rate was 58 bpm.  [Chaperone Present] : A chaperone was present in the examining room during all aspects of the physical examination [Appropriately responsive] : appropriately responsive [Alert] : alert [No Acute Distress] : no acute distress [Soft] : soft [Non-tender] : non-tender [Non-distended] : non-distended [No HSM] : No HSM [No Lesions] : no lesions [No Mass] : no mass [Oriented x3] : oriented x3 [Examination Of The Breasts] : a normal appearance [No Masses] : no breast masses were palpable [Labia Majora] : normal [Labia Minora] : normal [Normal] : normal [Uterine Adnexae] : normal

## 2022-01-10 NOTE — HISTORY OF PRESENT ILLNESS
[N] : Patient does not use contraception [Y] : Positive pregnancy history [Regular Cycle Intervals] : periods have been regular [Frequency: Q ___ days] : menstrual periods occur approximately every [unfilled] days [Menarche Age: ____] : age at menarche was [unfilled] [PGxTotal] : 1 [[facility] CorporationxFulerm] : 1 [PGHxPremature] : 0 [PGHxAbortions] : 0 [[facility] Servicesiving] : 1 [PGHxABInduced] : 0 [PGHxABSpont] : 0 [PGHxEctopic] : 0 [PGHxMultBirths] : 0

## 2022-01-11 LAB — HPV HIGH+LOW RISK DNA PNL CVX: NOT DETECTED

## 2022-01-12 LAB — CYTOLOGY CVX/VAG DOC THIN PREP: NORMAL

## 2023-01-23 ENCOUNTER — RESULT CHARGE (OUTPATIENT)
Age: 38
End: 2023-01-23

## 2023-01-23 ENCOUNTER — APPOINTMENT (OUTPATIENT)
Dept: OBGYN | Facility: CLINIC | Age: 38
End: 2023-01-23
Payer: COMMERCIAL

## 2023-01-23 VITALS
DIASTOLIC BLOOD PRESSURE: 80 MMHG | WEIGHT: 198 LBS | BODY MASS INDEX: 37.38 KG/M2 | SYSTOLIC BLOOD PRESSURE: 126 MMHG | HEIGHT: 61 IN

## 2023-01-23 LAB
HCG UR QL: POSITIVE
QUALITY CONTROL: YES

## 2023-01-23 PROCEDURE — 99212 OFFICE O/P EST SF 10 MIN: CPT | Mod: 25

## 2023-01-23 PROCEDURE — 81025 URINE PREGNANCY TEST: CPT

## 2023-01-23 PROCEDURE — 99395 PREV VISIT EST AGE 18-39: CPT

## 2023-01-23 RX ORDER — PRENATAL VIT,CAL 76/IRON/FOLIC 29 MG-1 MG
TABLET ORAL
Refills: 0 | Status: ACTIVE | COMMUNITY

## 2023-01-23 RX ORDER — METRONIDAZOLE 7.5 MG/G
0.75 GEL VAGINAL
Qty: 1 | Refills: 0 | Status: DISCONTINUED | COMMUNITY
Start: 2021-12-29 | End: 2023-01-23

## 2023-01-23 NOTE — HISTORY OF PRESENT ILLNESS
[N] : Patient does not use contraception [Y] : Positive pregnancy history [Regular Cycle Intervals] : periods have been regular [Frequency: Q ___ days] : menstrual periods occur approximately every [unfilled] days [Menarche Age: ____] : age at menarche was [unfilled] [PGHxTotal] : 2 [[facility] HospitalxFul[facility]] : 1 [PGHxPremature] : 0 [[facility]iving] : 1 [PGHxAbortions] : 0 [PGHxABInduced] : 0 [PGHxABSpont] : 0 [PGHxEctopic] : 0 [PGHxMultBirths] : 0

## 2023-01-24 LAB — HPV HIGH+LOW RISK DNA PNL CVX: NOT DETECTED

## 2023-01-26 LAB — CYTOLOGY CVX/VAG DOC THIN PREP: NORMAL

## 2023-02-06 ENCOUNTER — LABORATORY RESULT (OUTPATIENT)
Age: 38
End: 2023-02-06

## 2023-02-06 DIAGNOSIS — O24.410 GESTATIONAL DIABETES MELLITUS IN PREGNANCY, DIET CONTROLLED: ICD-10-CM

## 2023-02-06 DIAGNOSIS — Z09 ENCOUNTER FOR FOLLOW-UP EXAMINATION AFTER COMPLETED TREATMENT FOR CONDITIONS OTHER THAN MALIGNANT NEOPLASM: ICD-10-CM

## 2023-02-06 DIAGNOSIS — Z01.419 ENCOUNTER FOR GYNECOLOGICAL EXAMINATION (GENERAL) (ROUTINE) W/OUT ABNORMAL FINDINGS: ICD-10-CM

## 2023-02-06 DIAGNOSIS — Z86.32 PERSONAL HISTORY OF GESTATIONAL DIABETES: ICD-10-CM

## 2023-02-06 DIAGNOSIS — Z87.42 PERSONAL HISTORY OF OTHER DISEASES OF THE FEMALE GENITAL TRACT: ICD-10-CM

## 2023-02-06 DIAGNOSIS — O09.529 SUPERVISION OF ELDERLY MULTIGRAVIDA, UNSPECIFIED TRIMESTER: ICD-10-CM

## 2023-02-06 DIAGNOSIS — R10.2 PELVIC AND PERINEAL PAIN: ICD-10-CM

## 2023-02-07 ENCOUNTER — NON-APPOINTMENT (OUTPATIENT)
Age: 38
End: 2023-02-07

## 2023-02-07 ENCOUNTER — APPOINTMENT (OUTPATIENT)
Dept: ANTEPARTUM | Facility: CLINIC | Age: 38
End: 2023-02-07
Payer: COMMERCIAL

## 2023-02-07 ENCOUNTER — ASOB RESULT (OUTPATIENT)
Age: 38
End: 2023-02-07

## 2023-02-07 ENCOUNTER — APPOINTMENT (OUTPATIENT)
Dept: OBGYN | Facility: CLINIC | Age: 38
End: 2023-02-07
Payer: COMMERCIAL

## 2023-02-07 VITALS
SYSTOLIC BLOOD PRESSURE: 124 MMHG | DIASTOLIC BLOOD PRESSURE: 82 MMHG | BODY MASS INDEX: 37.95 KG/M2 | WEIGHT: 201 LBS | HEIGHT: 61 IN

## 2023-02-07 PROCEDURE — 36415 COLL VENOUS BLD VENIPUNCTURE: CPT

## 2023-02-07 PROCEDURE — 81003 URINALYSIS AUTO W/O SCOPE: CPT | Mod: QW

## 2023-02-07 PROCEDURE — 0501F PRENATAL FLOW SHEET: CPT

## 2023-02-07 PROCEDURE — 76817 TRANSVAGINAL US OBSTETRIC: CPT

## 2023-02-08 ENCOUNTER — NON-APPOINTMENT (OUTPATIENT)
Age: 38
End: 2023-02-08

## 2023-02-10 DIAGNOSIS — O23.40 UNSPECIFIED INFECTION OF URINARY TRACT IN PREGNANCY, UNSPECIFIED TRIMESTER: ICD-10-CM

## 2023-02-10 LAB
ABO + RH PNL BLD: NORMAL
ALBUMIN SERPL ELPH-MCNC: 4.3 G/DL
ALP BLD-CCNC: 68 U/L
ALT SERPL-CCNC: 17 U/L
ANION GAP SERPL CALC-SCNC: 17 MMOL/L
APPEARANCE: ABNORMAL
AST SERPL-CCNC: 18 U/L
B19V IGG SER QL IA: 0.64 INDEX
B19V IGG+IGM SER-IMP: NEGATIVE
B19V IGG+IGM SER-IMP: NORMAL
B19V IGM FLD-ACNC: 0.32 INDEX
B19V IGM SER-ACNC: NEGATIVE
BASOPHILS # BLD AUTO: 0.02 K/UL
BASOPHILS NFR BLD AUTO: 0.2 %
BILIRUB SERPL-MCNC: <0.2 MG/DL
BILIRUBIN URINE: NEGATIVE
BLD GP AB SCN SERPL QL: NORMAL
BLOOD URINE: NEGATIVE
BUN SERPL-MCNC: 15 MG/DL
CALCIUM SERPL-MCNC: 9.7 MG/DL
CHLORIDE SERPL-SCNC: 101 MMOL/L
CMV IGG SERPL QL: <0.2 U/ML
CMV IGG SERPL-IMP: NEGATIVE
CMV IGM SERPL QL: <8 AU/ML
CMV IGM SERPL QL: NEGATIVE
CO2 SERPL-SCNC: 20 MMOL/L
COLOR: NORMAL
CREAT SERPL-MCNC: 0.73 MG/DL
EGFR: 109 ML/MIN/1.73M2
EOSINOPHIL # BLD AUTO: 0.1 K/UL
EOSINOPHIL NFR BLD AUTO: 1 %
ESTIMATED AVERAGE GLUCOSE: 103 MG/DL
GLUCOSE QUALITATIVE U: NEGATIVE
GLUCOSE SERPL-MCNC: 72 MG/DL
HBA1C MFR BLD HPLC: 5.2 %
HBV SURFACE AG SER QL: NONREACTIVE
HCT VFR BLD CALC: 37.7 %
HCV AB SER QL: NONREACTIVE
HCV S/CO RATIO: 0.05 S/CO
HGB A MFR BLD: 97 %
HGB A2 MFR BLD: 3 %
HGB BLD-MCNC: 12.5 G/DL
HGB FRACT BLD-IMP: NORMAL
HIV1+2 AB SPEC QL IA.RAPID: NONREACTIVE
IMM GRANULOCYTES NFR BLD AUTO: 0.3 %
KETONES URINE: NEGATIVE
LEAD BLD-MCNC: <1 UG/DL
LEUKOCYTE ESTERASE URINE: ABNORMAL
LYMPHOCYTES # BLD AUTO: 1.97 K/UL
LYMPHOCYTES NFR BLD AUTO: 20.4 %
M TB IFN-G BLD-IMP: NEGATIVE
MAN DIFF?: NORMAL
MCHC RBC-ENTMCNC: 32.1 PG
MCHC RBC-ENTMCNC: 33.2 GM/DL
MCV RBC AUTO: 96.7 FL
MEV IGG FLD QL IA: 111 AU/ML
MEV IGG+IGM SER-IMP: POSITIVE
MONOCYTES # BLD AUTO: 0.66 K/UL
MONOCYTES NFR BLD AUTO: 6.8 %
MUV AB SER-ACNC: NEGATIVE
MUV IGG SER QL IA: <5 AU/ML
NEUTROPHILS # BLD AUTO: 6.9 K/UL
NEUTROPHILS NFR BLD AUTO: 71.3 %
NITRITE URINE: NEGATIVE
PH URINE: 5.5
PLATELET # BLD AUTO: 350 K/UL
POTASSIUM SERPL-SCNC: 3.9 MMOL/L
PROT SERPL-MCNC: 7 G/DL
PROTEIN URINE: NORMAL
QUANTIFERON TB PLUS MITOGEN MINUS NIL: >10 IU/ML
QUANTIFERON TB PLUS NIL: 0.01 IU/ML
QUANTIFERON TB PLUS TB1 MINUS NIL: 0 IU/ML
QUANTIFERON TB PLUS TB2 MINUS NIL: 0 IU/ML
RBC # BLD: 3.9 M/UL
RBC # FLD: 12.9 %
RUBV IGG FLD-ACNC: 4.6 INDEX
RUBV IGG SER-IMP: POSITIVE
SODIUM SERPL-SCNC: 139 MMOL/L
SPECIFIC GRAVITY URINE: 1.03
T GONDII AB SER-IMP: NEGATIVE
T GONDII AB SER-IMP: NEGATIVE
T GONDII IGG SER QL: <3 IU/ML
T GONDII IGM SER QL: <3 AU/ML
T PALLIDUM AB SER QL IA: NEGATIVE
TSH SERPL-ACNC: 1.89 UIU/ML
UROBILINOGEN URINE: NORMAL
VZV AB TITR SER: POSITIVE
VZV IGG SER IF-ACNC: 1391 INDEX
WBC # FLD AUTO: 9.68 K/UL

## 2023-02-12 LAB — FMR1 GENE MUT ANL BLD/T: NORMAL

## 2023-02-13 LAB — AR GENE MUT ANL BLD/T: NORMAL

## 2023-02-22 ENCOUNTER — APPOINTMENT (OUTPATIENT)
Dept: MATERNAL FETAL MEDICINE | Facility: CLINIC | Age: 38
End: 2023-02-22
Payer: COMMERCIAL

## 2023-02-22 ENCOUNTER — ASOB RESULT (OUTPATIENT)
Age: 38
End: 2023-02-22

## 2023-02-22 DIAGNOSIS — O21.9 VOMITING OF PREGNANCY, UNSPECIFIED: ICD-10-CM

## 2023-02-22 LAB — CFTR MUT TESTED BLD/T: NEGATIVE

## 2023-02-22 PROCEDURE — 99202 OFFICE O/P NEW SF 15 MIN: CPT | Mod: 95

## 2023-02-22 RX ORDER — ONDANSETRON 4 MG/1
4 TABLET ORAL
Qty: 42 | Refills: 1 | Status: ACTIVE | COMMUNITY
Start: 2023-02-22 | End: 1900-01-01

## 2023-03-03 ENCOUNTER — NON-APPOINTMENT (OUTPATIENT)
Age: 38
End: 2023-03-03

## 2023-03-03 ENCOUNTER — APPOINTMENT (OUTPATIENT)
Dept: ANTEPARTUM | Facility: CLINIC | Age: 38
End: 2023-03-03
Payer: COMMERCIAL

## 2023-03-03 ENCOUNTER — ASOB RESULT (OUTPATIENT)
Age: 38
End: 2023-03-03

## 2023-03-03 PROCEDURE — 36415 COLL VENOUS BLD VENIPUNCTURE: CPT

## 2023-03-03 PROCEDURE — 76813 OB US NUCHAL MEAS 1 GEST: CPT

## 2023-03-06 ENCOUNTER — APPOINTMENT (OUTPATIENT)
Dept: OBGYN | Facility: CLINIC | Age: 38
End: 2023-03-06
Payer: COMMERCIAL

## 2023-03-06 VITALS
SYSTOLIC BLOOD PRESSURE: 150 MMHG | DIASTOLIC BLOOD PRESSURE: 90 MMHG | BODY MASS INDEX: 37.19 KG/M2 | WEIGHT: 197 LBS | HEIGHT: 61 IN

## 2023-03-06 PROCEDURE — 0502F SUBSEQUENT PRENATAL CARE: CPT

## 2023-03-06 PROCEDURE — 81003 URINALYSIS AUTO W/O SCOPE: CPT | Mod: QW

## 2023-03-08 LAB
ADDITIONAL US: NORMAL
COMMENTS: AFP: NORMAL
CRL SCAN TWIN B: NORMAL
CRL SCAN: NORMAL
CROWN RUMP LENGTH TWIN B: NORMAL
CROWN RUMP LENGTH: 66.1 MM
DOWN SYNDROME AGE RISK: NORMAL
DOWN SYNDROME INTERPRETATION: NORMAL
DOWN SYNDROME SCREENING RISK: NORMAL
GEST. AGE ON COLLECTION DATE: 12.7 WEEKS
HCG MOM: 0.9
HCG VALUE: 70.6 IU/ML
MATERNAL AGE AT EDD: 38.1 YR
NOTE: AFP: NORMAL
NT MOM TWIN B: NORMAL
NT TWIN B: NORMAL
NUCHAL TRANSLUCENCY (NT): 2 MM
NUCHAL TRANSLUCENCY MOM: 1.16
NUMBER OF FETUSES: 1
PAPP-A MOM: 1.15
PAPP-A VALUE: 881.6 NG/ML
RACE: NORMAL
RESULTS AFP: NORMAL
SONOGRAPHER ID#: NORMAL
SUBMIT PART 2 SAMPLE USING: NORMAL
TEST RESULTS: AFP: NORMAL
TRISOMY 18 AGE RISK: NORMAL
TRISOMY 18 INTERPRETATION: NORMAL
TRISOMY 18 SCREENING RISK: NORMAL
WEIGHT AFP: 202 LBS

## 2023-03-10 ENCOUNTER — NON-APPOINTMENT (OUTPATIENT)
Age: 38
End: 2023-03-10

## 2023-03-13 ENCOUNTER — APPOINTMENT (OUTPATIENT)
Dept: OBGYN | Facility: CLINIC | Age: 38
End: 2023-03-13

## 2023-03-27 DIAGNOSIS — Z34.92 ENCOUNTER FOR SUPERVISION OF NORMAL PREGNANCY, UNSPECIFIED, SECOND TRIMESTER: ICD-10-CM

## 2023-03-27 DIAGNOSIS — Z34.91 ENCOUNTER FOR SUPERVISION OF NORMAL PREGNANCY, UNSPECIFIED, FIRST TRIMESTER: ICD-10-CM

## 2023-04-03 ENCOUNTER — APPOINTMENT (OUTPATIENT)
Dept: OBGYN | Facility: CLINIC | Age: 38
End: 2023-04-03
Payer: COMMERCIAL

## 2023-04-03 VITALS
DIASTOLIC BLOOD PRESSURE: 70 MMHG | HEIGHT: 61 IN | BODY MASS INDEX: 37.95 KG/M2 | WEIGHT: 201 LBS | SYSTOLIC BLOOD PRESSURE: 120 MMHG

## 2023-04-03 DIAGNOSIS — Z86.32 PERSONAL HISTORY OF GESTATIONAL DIABETES: ICD-10-CM

## 2023-04-03 PROCEDURE — 0502F SUBSEQUENT PRENATAL CARE: CPT

## 2023-04-03 PROCEDURE — 36415 COLL VENOUS BLD VENIPUNCTURE: CPT

## 2023-04-06 LAB
ADDITIONAL US: NORMAL
AFP MOM: 1.16
AFP VALUE: 36 NG/ML
COLLECTED ON 2: NORMAL
COLLECTED ON: NORMAL
CRL SCAN TWIN B: NORMAL
CRL SCAN: NORMAL
CROWN RUMP LENGTH TWIN B: NORMAL
CROWN RUMP LENGTH: 66.1 MM
DIA MOM: 1.02
DIA VALUE: 131.3 PG/ML
DOWN SYNDROME AGE RISK: NORMAL
DOWN SYNDROME INTERPRETATION: NORMAL
DOWN SYNDROME SCREENING RISK: NORMAL
FIRST TRIMESTER SAMPLE: NORMAL
GEST. AGE ON COLLECTION DATE: 12.7 WEEKS
GESTATIONAL AGE: 17.1 WEEKS
HCG MOM: 0.99
HCG VALUE: 24.4 IU/ML
INSULIN DEP DIABETES: NO
MATERNAL AGE AT EDD: 38.1 YR
NT MOM TWIN B: NORMAL
NT TWIN B: NORMAL
NUCHAL TRANSLUCENCY (NT): 2 MM
NUCHAL TRANSLUCENCY MOM: 1.16
NUMBER OF FETUSES: 1
OPEN SPINA BIFIDA: NORMAL
OSB INTERPRETATION: NORMAL
PAPP-A MOM: 1.15
PAPP-A VALUE: 881.6 NG/ML
RACE: NORMAL
SECOND TRIMESTER SAMPLE: NORMAL
SEQUENTIAL 2 COMMENTS: NORMAL
SEQUENTIAL 2 NOTE: NORMAL
SEQUENTIAL 2 RESULTS: NORMAL
SEQUENTIAL 2 TEST RESULTS: NORMAL
SONOGRAPHER ID#: NORMAL
TRISOMY 18 AGE RISK: NORMAL
TRISOMY 18 INTERPRETATION: NORMAL
TRISOMY 18 SCREENING RISK: NORMAL
UE3 MOM: 1.24
UE3 VALUE: 1.35 NG/ML
WEIGHT AFP: 202 LBS
WEIGHT: 197 LBS

## 2023-04-26 LAB
BILIRUB UR QL STRIP: NORMAL
GLUCOSE UR-MCNC: NORMAL
HCG UR QL: 0.2 EU/DL
HGB UR QL STRIP.AUTO: NORMAL
KETONES UR-MCNC: NORMAL
LEUKOCYTE ESTERASE UR QL STRIP: ABNORMAL
NITRITE UR QL STRIP: NORMAL
PH UR STRIP: 5
PROT UR STRIP-MCNC: NORMAL
SP GR UR STRIP: 1.03

## 2023-04-28 ENCOUNTER — ASOB RESULT (OUTPATIENT)
Age: 38
End: 2023-04-28

## 2023-04-28 ENCOUNTER — APPOINTMENT (OUTPATIENT)
Dept: ANTEPARTUM | Facility: CLINIC | Age: 38
End: 2023-04-28
Payer: COMMERCIAL

## 2023-04-28 PROCEDURE — 76817 TRANSVAGINAL US OBSTETRIC: CPT

## 2023-04-28 PROCEDURE — 76811 OB US DETAILED SNGL FETUS: CPT

## 2023-05-01 ENCOUNTER — APPOINTMENT (OUTPATIENT)
Dept: OBGYN | Facility: CLINIC | Age: 38
End: 2023-05-01
Payer: COMMERCIAL

## 2023-05-01 VITALS
HEIGHT: 61 IN | DIASTOLIC BLOOD PRESSURE: 70 MMHG | WEIGHT: 202 LBS | SYSTOLIC BLOOD PRESSURE: 126 MMHG | BODY MASS INDEX: 38.14 KG/M2

## 2023-05-01 DIAGNOSIS — B96.89 ACUTE VAGINITIS: ICD-10-CM

## 2023-05-01 DIAGNOSIS — N76.0 ACUTE VAGINITIS: ICD-10-CM

## 2023-05-01 LAB
BILIRUB UR QL STRIP: NORMAL
CLARITY UR: CLEAR
COLLECTION METHOD: NORMAL
GLUCOSE UR-MCNC: NORMAL
HCG UR QL: 0.2 EU/DL
HGB UR QL STRIP.AUTO: NORMAL
KETONES UR-MCNC: NORMAL
LEUKOCYTE ESTERASE UR QL STRIP: NORMAL
NITRITE UR QL STRIP: NORMAL
PH UR STRIP: 5.5
PROT UR STRIP-MCNC: NORMAL
SP GR UR STRIP: 1.02

## 2023-05-01 PROCEDURE — 81003 URINALYSIS AUTO W/O SCOPE: CPT | Mod: QW

## 2023-05-01 PROCEDURE — 0502F SUBSEQUENT PRENATAL CARE: CPT

## 2023-05-01 RX ORDER — ASPIRIN 81 MG
81 TABLET, DELAYED RELEASE (ENTERIC COATED) ORAL
Refills: 0 | Status: ACTIVE | COMMUNITY

## 2023-05-01 RX ORDER — METRONIDAZOLE 7.5 MG/G
0.75 GEL VAGINAL
Qty: 1 | Refills: 0 | Status: ACTIVE | COMMUNITY
Start: 2023-05-01 | End: 1900-01-01

## 2023-05-22 ENCOUNTER — NON-APPOINTMENT (OUTPATIENT)
Age: 38
End: 2023-05-22

## 2023-05-22 ENCOUNTER — RESULT CHARGE (OUTPATIENT)
Age: 38
End: 2023-05-22

## 2023-05-22 ENCOUNTER — APPOINTMENT (OUTPATIENT)
Dept: OBGYN | Facility: CLINIC | Age: 38
End: 2023-05-22
Payer: COMMERCIAL

## 2023-05-22 VITALS
BODY MASS INDEX: 38.89 KG/M2 | SYSTOLIC BLOOD PRESSURE: 130 MMHG | DIASTOLIC BLOOD PRESSURE: 88 MMHG | WEIGHT: 206 LBS | HEIGHT: 61 IN

## 2023-05-22 LAB
BILIRUB UR QL STRIP: NORMAL
CLARITY UR: CLEAR
COLLECTION METHOD: NORMAL
GLUCOSE UR-MCNC: NORMAL
HCG UR QL: 0.2 EU/DL
HGB UR QL STRIP.AUTO: NORMAL
KETONES UR-MCNC: NORMAL
LEUKOCYTE ESTERASE UR QL STRIP: NORMAL
NITRITE UR QL STRIP: NORMAL
PH UR STRIP: 5
PROT UR STRIP-MCNC: NORMAL
SP GR UR STRIP: 1.03

## 2023-05-22 PROCEDURE — 0502F SUBSEQUENT PRENATAL CARE: CPT

## 2023-05-22 PROCEDURE — 36415 COLL VENOUS BLD VENIPUNCTURE: CPT

## 2023-05-22 RX ORDER — MULTIVIT-MIN/FOLIC/VIT K/LYCOP 400-300MCG
28-0.8 TABLET ORAL DAILY
Qty: 90 | Refills: 1 | Status: DISCONTINUED | COMMUNITY
Start: 2019-01-31 | End: 2023-05-22

## 2023-05-22 RX ORDER — CALCIUM ASCORBATE DIHYDRATE, CHOLECALCIFEROL, DL-A TOCOPHERYL ACETATE, RIBOFLAVIN, NIACINAMIDE, FOLATE, BIOTIN, CALCIUM, FERROUS ASPARTO GLYCINATE, POTASSIUM IODIDE, MAGNESIUM OXIDE, ZINC BISGLYCINATE CHELATE, ALPHA LINOLEIC ACID, DHA, EPA 25; 1000; 15; 1.5; 10; 50; 1; 1; 100; 150; 50; 15; 50; 42 MG/1; [IU]/1; [IU]/1; MG/1; MG/1; MG/1; MG/1; MG/1; MG/1; UG/1; MG/1; MG/1; MG/1; MG/1
30-1-470 PILL ORAL
Qty: 90 | Refills: 1 | Status: ACTIVE | COMMUNITY
Start: 2023-05-22 | End: 1900-01-01

## 2023-05-23 LAB
BLD GP AB SCN SERPL QL: NORMAL
GLUCOSE 1H P 50 G GLC PO SERPL-MCNC: 133 MG/DL

## 2023-06-12 ENCOUNTER — APPOINTMENT (OUTPATIENT)
Dept: OBGYN | Facility: CLINIC | Age: 38
End: 2023-06-12
Payer: COMMERCIAL

## 2023-06-12 ENCOUNTER — RESULT CHARGE (OUTPATIENT)
Age: 38
End: 2023-06-12

## 2023-06-12 VITALS
SYSTOLIC BLOOD PRESSURE: 128 MMHG | HEIGHT: 61 IN | BODY MASS INDEX: 39.46 KG/M2 | WEIGHT: 209 LBS | DIASTOLIC BLOOD PRESSURE: 88 MMHG

## 2023-06-12 PROCEDURE — 0502F SUBSEQUENT PRENATAL CARE: CPT

## 2023-06-30 ENCOUNTER — ASOB RESULT (OUTPATIENT)
Age: 38
End: 2023-06-30

## 2023-06-30 ENCOUNTER — APPOINTMENT (OUTPATIENT)
Dept: ANTEPARTUM | Facility: CLINIC | Age: 38
End: 2023-06-30
Payer: COMMERCIAL

## 2023-06-30 PROCEDURE — 76816 OB US FOLLOW-UP PER FETUS: CPT

## 2023-07-03 ENCOUNTER — APPOINTMENT (OUTPATIENT)
Dept: OBGYN | Facility: CLINIC | Age: 38
End: 2023-07-03
Payer: COMMERCIAL

## 2023-07-03 VITALS
SYSTOLIC BLOOD PRESSURE: 138 MMHG | HEIGHT: 61 IN | BODY MASS INDEX: 39.84 KG/M2 | DIASTOLIC BLOOD PRESSURE: 84 MMHG | WEIGHT: 211 LBS

## 2023-07-03 DIAGNOSIS — Z29.13 ENCOUNTER FOR PROPHYLACTIC RHO(D) IMMUNE GLOBULIN: ICD-10-CM

## 2023-07-03 DIAGNOSIS — Z23 ENCOUNTER FOR IMMUNIZATION: ICD-10-CM

## 2023-07-03 LAB
BILIRUB UR QL STRIP: NORMAL
CLARITY UR: CLEAR
COLLECTION METHOD: NORMAL
GLUCOSE UR-MCNC: NORMAL
HCG UR QL: 0.2 EU/DL
HGB UR QL STRIP.AUTO: NORMAL
KETONES UR-MCNC: NORMAL
LEUKOCYTE ESTERASE UR QL STRIP: NORMAL
NITRITE UR QL STRIP: NORMAL
PH UR STRIP: 5.5
PROT UR STRIP-MCNC: NORMAL
SP GR UR STRIP: >1.03

## 2023-07-03 PROCEDURE — 96372 THER/PROPH/DIAG INJ SC/IM: CPT

## 2023-07-03 PROCEDURE — 90384 RH IG FULL-DOSE IM: CPT

## 2023-07-03 PROCEDURE — 0502F SUBSEQUENT PRENATAL CARE: CPT

## 2023-07-03 RX ORDER — HUMAN RHO(D) IMMUNE GLOBULIN 300 UG/1
1500 INJECTION, SOLUTION INTRAMUSCULAR
Refills: 0 | Status: ACTIVE | COMMUNITY
Start: 2023-07-03

## 2023-07-10 DIAGNOSIS — Z3A.30 30 WEEKS GESTATION OF PREGNANCY: ICD-10-CM

## 2023-07-17 ENCOUNTER — APPOINTMENT (OUTPATIENT)
Dept: OBGYN | Facility: CLINIC | Age: 38
End: 2023-07-17
Payer: COMMERCIAL

## 2023-07-17 VITALS
SYSTOLIC BLOOD PRESSURE: 130 MMHG | WEIGHT: 212 LBS | HEIGHT: 61 IN | DIASTOLIC BLOOD PRESSURE: 82 MMHG | BODY MASS INDEX: 40.02 KG/M2

## 2023-07-17 DIAGNOSIS — Z23 ENCOUNTER FOR IMMUNIZATION: ICD-10-CM

## 2023-07-17 LAB
BILIRUB UR QL STRIP: NORMAL
GLUCOSE UR-MCNC: NORMAL
HCG UR QL: 0.2 EU/DL
HGB UR QL STRIP.AUTO: 5.5
KETONES UR-MCNC: NORMAL
LEUKOCYTE ESTERASE UR QL STRIP: NORMAL
NITRITE UR QL STRIP: NORMAL
PH UR STRIP: 30
PROT UR STRIP-MCNC: 0.2
SP GR UR STRIP: 1.03

## 2023-07-17 PROCEDURE — 90715 TDAP VACCINE 7 YRS/> IM: CPT

## 2023-07-17 PROCEDURE — 90471 IMMUNIZATION ADMIN: CPT

## 2023-07-17 PROCEDURE — 0502F SUBSEQUENT PRENATAL CARE: CPT

## 2023-07-31 ENCOUNTER — APPOINTMENT (OUTPATIENT)
Dept: OBGYN | Facility: CLINIC | Age: 38
End: 2023-07-31
Payer: COMMERCIAL

## 2023-07-31 VITALS
WEIGHT: 208 LBS | HEIGHT: 61 IN | DIASTOLIC BLOOD PRESSURE: 76 MMHG | BODY MASS INDEX: 39.27 KG/M2 | SYSTOLIC BLOOD PRESSURE: 128 MMHG

## 2023-07-31 LAB
BILIRUB UR QL STRIP: NORMAL
GLUCOSE UR-MCNC: NORMAL
HCG UR QL: 0.2 EU/DL
HGB UR QL STRIP.AUTO: NORMAL
KETONES UR-MCNC: NORMAL
LEUKOCYTE ESTERASE UR QL STRIP: NORMAL
NITRITE UR QL STRIP: NORMAL
PH UR STRIP: 5.5
PROT UR STRIP-MCNC: NORMAL
SP GR UR STRIP: 1.03

## 2023-07-31 PROCEDURE — 0502F SUBSEQUENT PRENATAL CARE: CPT

## 2023-07-31 PROCEDURE — 81003 URINALYSIS AUTO W/O SCOPE: CPT | Mod: NC,QW

## 2023-08-07 ENCOUNTER — APPOINTMENT (OUTPATIENT)
Dept: OBGYN | Facility: CLINIC | Age: 38
End: 2023-08-07
Payer: COMMERCIAL

## 2023-08-07 VITALS
SYSTOLIC BLOOD PRESSURE: 122 MMHG | HEIGHT: 61 IN | WEIGHT: 214 LBS | DIASTOLIC BLOOD PRESSURE: 74 MMHG | BODY MASS INDEX: 40.4 KG/M2

## 2023-08-07 PROCEDURE — 81003 URINALYSIS AUTO W/O SCOPE: CPT | Mod: NC,QW

## 2023-08-07 PROCEDURE — 0502F SUBSEQUENT PRENATAL CARE: CPT

## 2023-08-08 DIAGNOSIS — Z34.93 ENCOUNTER FOR SUPERVISION OF NORMAL PREGNANCY, UNSPECIFIED, THIRD TRIMESTER: ICD-10-CM

## 2023-08-15 ENCOUNTER — ASOB RESULT (OUTPATIENT)
Age: 38
End: 2023-08-15

## 2023-08-15 ENCOUNTER — APPOINTMENT (OUTPATIENT)
Dept: ANTEPARTUM | Facility: CLINIC | Age: 38
End: 2023-08-15
Payer: COMMERCIAL

## 2023-08-15 ENCOUNTER — APPOINTMENT (OUTPATIENT)
Dept: OBGYN | Facility: CLINIC | Age: 38
End: 2023-08-15
Payer: COMMERCIAL

## 2023-08-15 VITALS
SYSTOLIC BLOOD PRESSURE: 122 MMHG | HEIGHT: 61 IN | BODY MASS INDEX: 40.4 KG/M2 | WEIGHT: 214 LBS | DIASTOLIC BLOOD PRESSURE: 76 MMHG

## 2023-08-15 LAB
BILIRUB UR QL STRIP: NORMAL
GLUCOSE UR-MCNC: NORMAL
HCG UR QL: 0.2 EU/DL
HGB UR QL STRIP.AUTO: NORMAL
KETONES UR-MCNC: NORMAL
LEUKOCYTE ESTERASE UR QL STRIP: NORMAL
NITRITE UR QL STRIP: NORMAL
PH UR STRIP: 5.5
PROT UR STRIP-MCNC: 30
SP GR UR STRIP: >1.03

## 2023-08-15 PROCEDURE — 76816 OB US FOLLOW-UP PER FETUS: CPT

## 2023-08-15 PROCEDURE — 0502F SUBSEQUENT PRENATAL CARE: CPT

## 2023-08-16 LAB — HIV1+2 AB SPEC QL IA.RAPID: NONREACTIVE

## 2023-08-18 LAB
GP B STREP DNA SPEC QL NAA+PROBE: NOT DETECTED
SOURCE GBS: NORMAL
T PALLIDUM AB SER QL IA: NEGATIVE

## 2023-08-23 ENCOUNTER — APPOINTMENT (OUTPATIENT)
Dept: OBGYN | Facility: CLINIC | Age: 38
End: 2023-08-23
Payer: COMMERCIAL

## 2023-08-23 VITALS
SYSTOLIC BLOOD PRESSURE: 125 MMHG | WEIGHT: 216 LBS | HEIGHT: 61 IN | BODY MASS INDEX: 40.78 KG/M2 | DIASTOLIC BLOOD PRESSURE: 70 MMHG

## 2023-08-23 LAB
BILIRUB UR QL STRIP: NORMAL
GLUCOSE UR-MCNC: NORMAL
HCG UR QL: 0.2 EU/DL
HGB UR QL STRIP.AUTO: NORMAL
KETONES UR-MCNC: ABNORMAL
LEUKOCYTE ESTERASE UR QL STRIP: NORMAL
NITRITE UR QL STRIP: NORMAL
PH UR STRIP: 5.5
PROT UR STRIP-MCNC: NORMAL
SP GR UR STRIP: 1.03

## 2023-08-23 PROCEDURE — 81003 URINALYSIS AUTO W/O SCOPE: CPT | Mod: NC,QW

## 2023-08-23 PROCEDURE — 0502F SUBSEQUENT PRENATAL CARE: CPT

## 2023-08-30 ENCOUNTER — APPOINTMENT (OUTPATIENT)
Dept: OBGYN | Facility: CLINIC | Age: 38
End: 2023-08-30
Payer: COMMERCIAL

## 2023-08-30 VITALS
BODY MASS INDEX: 40.97 KG/M2 | HEIGHT: 61 IN | WEIGHT: 217 LBS | SYSTOLIC BLOOD PRESSURE: 118 MMHG | DIASTOLIC BLOOD PRESSURE: 78 MMHG

## 2023-08-30 PROCEDURE — 0502F SUBSEQUENT PRENATAL CARE: CPT

## 2023-08-30 PROCEDURE — 81003 URINALYSIS AUTO W/O SCOPE: CPT | Mod: NC,QW

## 2023-09-04 ENCOUNTER — TRANSCRIPTION ENCOUNTER (OUTPATIENT)
Age: 38
End: 2023-09-04

## 2023-09-04 RX ORDER — SODIUM CHLORIDE 9 MG/ML
1000 INJECTION, SOLUTION INTRAVENOUS
Refills: 0 | Status: DISCONTINUED | OUTPATIENT
Start: 2023-09-05 | End: 2023-09-05

## 2023-09-05 ENCOUNTER — INPATIENT (INPATIENT)
Facility: HOSPITAL | Age: 38
LOS: 1 days | Discharge: HOME | End: 2023-09-07
Attending: OBSTETRICS & GYNECOLOGY | Admitting: OBSTETRICS & GYNECOLOGY
Payer: COMMERCIAL

## 2023-09-05 ENCOUNTER — APPOINTMENT (OUTPATIENT)
Dept: OBGYN | Facility: HOSPITAL | Age: 38
End: 2023-09-05

## 2023-09-05 ENCOUNTER — RESULT REVIEW (OUTPATIENT)
Age: 38
End: 2023-09-05

## 2023-09-05 VITALS
RESPIRATION RATE: 16 BRPM | TEMPERATURE: 98 F | SYSTOLIC BLOOD PRESSURE: 136 MMHG | HEIGHT: 61 IN | DIASTOLIC BLOOD PRESSURE: 83 MMHG | WEIGHT: 216.93 LBS | HEART RATE: 92 BPM

## 2023-09-05 DIAGNOSIS — Z3A.39 39 WEEKS GESTATION OF PREGNANCY: ICD-10-CM

## 2023-09-05 LAB
ALBUMIN SERPL ELPH-MCNC: 3.1 G/DL — LOW (ref 3.3–5.2)
ALLERGY+IMMUNOLOGY DIAG STUDY NOTE: SIGNIFICANT CHANGE UP
ALP SERPL-CCNC: 93 U/L — SIGNIFICANT CHANGE UP (ref 40–120)
ALT FLD-CCNC: 10 U/L — SIGNIFICANT CHANGE UP
ANION GAP SERPL CALC-SCNC: 14 MMOL/L — SIGNIFICANT CHANGE UP (ref 5–17)
AST SERPL-CCNC: 21 U/L — SIGNIFICANT CHANGE UP
BASOPHILS # BLD AUTO: 0.03 K/UL — SIGNIFICANT CHANGE UP (ref 0–0.2)
BASOPHILS NFR BLD AUTO: 0.4 % — SIGNIFICANT CHANGE UP (ref 0–2)
BILIRUB SERPL-MCNC: <0.2 MG/DL — LOW (ref 0.4–2)
BLD GP AB SCN SERPL QL: SIGNIFICANT CHANGE UP
BUN SERPL-MCNC: 9.3 MG/DL — SIGNIFICANT CHANGE UP (ref 8–20)
CALCIUM SERPL-MCNC: 8.8 MG/DL — SIGNIFICANT CHANGE UP (ref 8.4–10.5)
CHLORIDE SERPL-SCNC: 105 MMOL/L — SIGNIFICANT CHANGE UP (ref 96–108)
CO2 SERPL-SCNC: 19 MMOL/L — LOW (ref 22–29)
CREAT SERPL-MCNC: 0.66 MG/DL — SIGNIFICANT CHANGE UP (ref 0.5–1.3)
DIR ANTIGLOB POLYSPECIFIC INTERPRETATION: SIGNIFICANT CHANGE UP
EGFR: 115 ML/MIN/1.73M2 — SIGNIFICANT CHANGE UP
EOSINOPHIL # BLD AUTO: 0.1 K/UL — SIGNIFICANT CHANGE UP (ref 0–0.5)
EOSINOPHIL NFR BLD AUTO: 1.3 % — SIGNIFICANT CHANGE UP (ref 0–6)
GLUCOSE SERPL-MCNC: 100 MG/DL — HIGH (ref 70–99)
HCT VFR BLD CALC: 34.4 % — LOW (ref 34.5–45)
HGB BLD-MCNC: 11.7 G/DL — SIGNIFICANT CHANGE UP (ref 11.5–15.5)
IMM GRANULOCYTES NFR BLD AUTO: 1.1 % — HIGH (ref 0–0.9)
LYMPHOCYTES # BLD AUTO: 1.64 K/UL — SIGNIFICANT CHANGE UP (ref 1–3.3)
LYMPHOCYTES # BLD AUTO: 21.8 % — SIGNIFICANT CHANGE UP (ref 13–44)
MCHC RBC-ENTMCNC: 32.1 PG — SIGNIFICANT CHANGE UP (ref 27–34)
MCHC RBC-ENTMCNC: 34 GM/DL — SIGNIFICANT CHANGE UP (ref 32–36)
MCV RBC AUTO: 94.5 FL — SIGNIFICANT CHANGE UP (ref 80–100)
MONOCYTES # BLD AUTO: 0.77 K/UL — SIGNIFICANT CHANGE UP (ref 0–0.9)
MONOCYTES NFR BLD AUTO: 10.2 % — SIGNIFICANT CHANGE UP (ref 2–14)
NEUTROPHILS # BLD AUTO: 4.9 K/UL — SIGNIFICANT CHANGE UP (ref 1.8–7.4)
NEUTROPHILS NFR BLD AUTO: 65.2 % — SIGNIFICANT CHANGE UP (ref 43–77)
PLATELET # BLD AUTO: 241 K/UL — SIGNIFICANT CHANGE UP (ref 150–400)
POTASSIUM SERPL-MCNC: 4.1 MMOL/L — SIGNIFICANT CHANGE UP (ref 3.5–5.3)
POTASSIUM SERPL-SCNC: 4.1 MMOL/L — SIGNIFICANT CHANGE UP (ref 3.5–5.3)
PROT SERPL-MCNC: 5.6 G/DL — LOW (ref 6.6–8.7)
RBC # BLD: 3.64 M/UL — LOW (ref 3.8–5.2)
RBC # FLD: 13.1 % — SIGNIFICANT CHANGE UP (ref 10.3–14.5)
SODIUM SERPL-SCNC: 138 MMOL/L — SIGNIFICANT CHANGE UP (ref 135–145)
T PALLIDUM AB TITR SER: NEGATIVE — SIGNIFICANT CHANGE UP
WBC # BLD: 7.52 K/UL — SIGNIFICANT CHANGE UP (ref 3.8–10.5)
WBC # FLD AUTO: 7.52 K/UL — SIGNIFICANT CHANGE UP (ref 3.8–10.5)

## 2023-09-05 PROCEDURE — 86077 PHYS BLOOD BANK SERV XMATCH: CPT

## 2023-09-05 PROCEDURE — 88302 TISSUE EXAM BY PATHOLOGIST: CPT | Mod: 26

## 2023-09-05 PROCEDURE — 59510 CESAREAN DELIVERY: CPT

## 2023-09-05 PROCEDURE — 58700 REMOVAL OF FALLOPIAN TUBE: CPT

## 2023-09-05 RX ORDER — SODIUM CHLORIDE 9 MG/ML
1000 INJECTION, SOLUTION INTRAVENOUS ONCE
Refills: 0 | Status: COMPLETED | OUTPATIENT
Start: 2023-09-05 | End: 2023-09-05

## 2023-09-05 RX ORDER — CEFAZOLIN SODIUM 1 G
2000 VIAL (EA) INJECTION ONCE
Refills: 0 | Status: COMPLETED | OUTPATIENT
Start: 2023-09-05 | End: 2023-09-05

## 2023-09-05 RX ORDER — PROCHLORPERAZINE MALEATE 5 MG
5 TABLET ORAL ONCE
Refills: 0 | Status: COMPLETED | OUTPATIENT
Start: 2023-09-05 | End: 2023-09-05

## 2023-09-05 RX ORDER — SIMETHICONE 80 MG/1
80 TABLET, CHEWABLE ORAL EVERY 4 HOURS
Refills: 0 | Status: DISCONTINUED | OUTPATIENT
Start: 2023-09-05 | End: 2023-09-07

## 2023-09-05 RX ORDER — ACETAMINOPHEN 500 MG
975 TABLET ORAL ONCE
Refills: 0 | Status: COMPLETED | OUTPATIENT
Start: 2023-09-05 | End: 2023-09-05

## 2023-09-05 RX ORDER — OXYTOCIN 10 UNIT/ML
333.33 VIAL (ML) INJECTION
Qty: 20 | Refills: 0 | Status: DISCONTINUED | OUTPATIENT
Start: 2023-09-05 | End: 2023-09-07

## 2023-09-05 RX ORDER — SODIUM CHLORIDE 9 MG/ML
1000 INJECTION, SOLUTION INTRAVENOUS
Refills: 0 | Status: DISCONTINUED | OUTPATIENT
Start: 2023-09-05 | End: 2023-09-07

## 2023-09-05 RX ORDER — OXYCODONE HYDROCHLORIDE 5 MG/1
5 TABLET ORAL
Refills: 0 | Status: DISCONTINUED | OUTPATIENT
Start: 2023-09-05 | End: 2023-09-07

## 2023-09-05 RX ORDER — SCOPALAMINE 1 MG/3D
1 PATCH, EXTENDED RELEASE TRANSDERMAL ONCE
Refills: 0 | Status: COMPLETED | OUTPATIENT
Start: 2023-09-05 | End: 2023-09-05

## 2023-09-05 RX ORDER — FAMOTIDINE 10 MG/ML
20 INJECTION INTRAVENOUS ONCE
Refills: 0 | Status: COMPLETED | OUTPATIENT
Start: 2023-09-05 | End: 2023-09-05

## 2023-09-05 RX ORDER — CEFAZOLIN SODIUM 1 G
2000 VIAL (EA) INJECTION ONCE
Refills: 0 | Status: DISCONTINUED | OUTPATIENT
Start: 2023-09-05 | End: 2023-09-05

## 2023-09-05 RX ORDER — IBUPROFEN 200 MG
600 TABLET ORAL EVERY 6 HOURS
Refills: 0 | Status: COMPLETED | OUTPATIENT
Start: 2023-09-05 | End: 2024-08-03

## 2023-09-05 RX ORDER — LANOLIN
1 OINTMENT (GRAM) TOPICAL EVERY 6 HOURS
Refills: 0 | Status: DISCONTINUED | OUTPATIENT
Start: 2023-09-05 | End: 2023-09-07

## 2023-09-05 RX ORDER — ONDANSETRON 8 MG/1
4 TABLET, FILM COATED ORAL EVERY 6 HOURS
Refills: 0 | Status: DISCONTINUED | OUTPATIENT
Start: 2023-09-05 | End: 2023-09-07

## 2023-09-05 RX ORDER — INFLUENZA VIRUS VACCINE 15; 15; 15; 15 UG/.5ML; UG/.5ML; UG/.5ML; UG/.5ML
0.5 SUSPENSION INTRAMUSCULAR ONCE
Refills: 0 | Status: DISCONTINUED | OUTPATIENT
Start: 2023-09-05 | End: 2023-09-07

## 2023-09-05 RX ORDER — KETOROLAC TROMETHAMINE 30 MG/ML
30 SYRINGE (ML) INJECTION EVERY 6 HOURS
Refills: 0 | Status: DISCONTINUED | OUTPATIENT
Start: 2023-09-05 | End: 2023-09-06

## 2023-09-05 RX ORDER — DIPHENHYDRAMINE HCL 50 MG
25 CAPSULE ORAL EVERY 6 HOURS
Refills: 0 | Status: DISCONTINUED | OUTPATIENT
Start: 2023-09-05 | End: 2023-09-07

## 2023-09-05 RX ORDER — MAGNESIUM HYDROXIDE 400 MG/1
30 TABLET, CHEWABLE ORAL
Refills: 0 | Status: DISCONTINUED | OUTPATIENT
Start: 2023-09-05 | End: 2023-09-07

## 2023-09-05 RX ORDER — OXYCODONE HYDROCHLORIDE 5 MG/1
5 TABLET ORAL ONCE
Refills: 0 | Status: DISCONTINUED | OUTPATIENT
Start: 2023-09-05 | End: 2023-09-07

## 2023-09-05 RX ORDER — OXYTOCIN 10 UNIT/ML
333.33 VIAL (ML) INJECTION
Qty: 20 | Refills: 0 | Status: COMPLETED | OUTPATIENT
Start: 2023-09-05 | End: 2023-09-05

## 2023-09-05 RX ORDER — ACETAMINOPHEN 500 MG
975 TABLET ORAL
Refills: 0 | Status: DISCONTINUED | OUTPATIENT
Start: 2023-09-05 | End: 2023-09-07

## 2023-09-05 RX ORDER — TETANUS TOXOID, REDUCED DIPHTHERIA TOXOID AND ACELLULAR PERTUSSIS VACCINE, ADSORBED 5; 2.5; 8; 8; 2.5 [IU]/.5ML; [IU]/.5ML; UG/.5ML; UG/.5ML; UG/.5ML
0.5 SUSPENSION INTRAMUSCULAR ONCE
Refills: 0 | Status: DISCONTINUED | OUTPATIENT
Start: 2023-09-05 | End: 2023-09-07

## 2023-09-05 RX ORDER — ENOXAPARIN SODIUM 100 MG/ML
60 INJECTION SUBCUTANEOUS EVERY 24 HOURS
Refills: 0 | Status: DISCONTINUED | OUTPATIENT
Start: 2023-09-05 | End: 2023-09-07

## 2023-09-05 RX ORDER — CITRIC ACID/SODIUM CITRATE 300-500 MG
30 SOLUTION, ORAL ORAL ONCE
Refills: 0 | Status: COMPLETED | OUTPATIENT
Start: 2023-09-05 | End: 2023-09-05

## 2023-09-05 RX ADMIN — Medication 975 MILLIGRAM(S): at 06:46

## 2023-09-05 RX ADMIN — Medication 0.2 MILLIGRAM(S): at 11:43

## 2023-09-05 RX ADMIN — SODIUM CHLORIDE 2000 MILLILITER(S): 9 INJECTION, SOLUTION INTRAVENOUS at 06:45

## 2023-09-05 RX ADMIN — Medication 30 MILLIGRAM(S): at 23:19

## 2023-09-05 RX ADMIN — ENOXAPARIN SODIUM 60 MILLIGRAM(S): 100 INJECTION SUBCUTANEOUS at 23:08

## 2023-09-05 RX ADMIN — Medication 30 MILLIGRAM(S): at 11:35

## 2023-09-05 RX ADMIN — Medication 30 MILLILITER(S): at 06:45

## 2023-09-05 RX ADMIN — Medication 102 MILLIGRAM(S): at 13:15

## 2023-09-05 RX ADMIN — Medication 1000 MILLIUNIT(S)/MIN: at 12:45

## 2023-09-05 RX ADMIN — ONDANSETRON 4 MILLIGRAM(S): 8 TABLET, FILM COATED ORAL at 17:04

## 2023-09-05 RX ADMIN — FAMOTIDINE 20 MILLIGRAM(S): 10 INJECTION INTRAVENOUS at 06:46

## 2023-09-05 RX ADMIN — SODIUM CHLORIDE 125 MILLILITER(S): 9 INJECTION, SOLUTION INTRAVENOUS at 07:39

## 2023-09-05 RX ADMIN — Medication 2000 MILLIGRAM(S): at 11:10

## 2023-09-05 RX ADMIN — Medication 30 MILLIGRAM(S): at 17:04

## 2023-09-05 RX ADMIN — Medication 975 MILLIGRAM(S): at 20:48

## 2023-09-05 RX ADMIN — Medication 30 MILLIGRAM(S): at 23:08

## 2023-09-05 RX ADMIN — Medication 975 MILLIGRAM(S): at 20:30

## 2023-09-05 RX ADMIN — Medication 1000 MILLIUNIT(S)/MIN: at 12:05

## 2023-09-05 RX ADMIN — SCOPALAMINE 1 PATCH: 1 PATCH, EXTENDED RELEASE TRANSDERMAL at 06:46

## 2023-09-05 NOTE — OB RN PATIENT PROFILE - FALL HARM RISK - UNIVERSAL INTERVENTIONS
Bed in lowest position, wheels locked, appropriate side rails in place/Call bell, personal items and telephone in reach/Instruct patient to call for assistance before getting out of bed or chair/Non-slip footwear when patient is out of bed/[place] to call system/Physically safe environment - no spills, clutter or unnecessary equipment/Purposeful Proactive Rounding/Room/bathroom lighting operational, light cord in reach

## 2023-09-05 NOTE — OB PROVIDER DELIVERY SUMMARY - NSPROVIDERDELIVERYNOTE_OBGYN_ALL_OB_FT
Brief  Delivery Summary    Procedure: rLTCS for previous ; bilateral tubal ligation  Findings: Viable female infant, apgars 9/9, weight 3200,  cephalic presentation. Grossly normal uterus,  right fallopian tube and ovaries; left fallopian tube with 5cm simple cyst. Fallopian tubes clamped and cut for tubal ligation using ligasure then sent for pathology.   Two layer uterine closure  SubQ skin closure  EBL:687cc  UOP:200cc  Fluids in OR: 1000cc  Complications: Uncomplicated Brief  Delivery Summary    Procedure: rLTCS for previous ; bilateral tubal ligation  Findings: Viable female infant, apgars 9/9, weight 3200,  cephalic presentation. Grossly normal uterus,  right fallopian tube and ovaries; left fallopian tube with 5cm simple cyst. Fallopian tubes clamped and cut for tubal ligation using ligasure then sent for pathology.   Two layer uterine closure  SubQ skin closure  EBL:687cc  UOP:200cc      DICTATION #: [id number]  Fluids in OR: 1000cc  Complications: Uncomplicated

## 2023-09-05 NOTE — OB PROVIDER H&P - HISTORY OF PRESENT ILLNESS
38y  at 39w2d GA by LMP consistent with first trimester sono who presents to L&D for repeat C/S.   SAMIRA: 9/10/2023  LMP: 2022  Prenatal course is significant for: AMA, obesity; Hx. gHTN, GDM in last pregnancy    Patient denies vaginal bleeding, contractions and leakage of fluid. She endorses good fetal movement. Denies fevers, chills, nausea and vomiting. No other complaints at this time.     POB: prior c/s @39w; failure to progress  PGYN: -fibroids, -ovarian cysts, denies STD hx, denies abnormal PAPs   PMH: anxiety  PSH: c/sx1  SH: Denies EtOH, tobacco and illicit drug use during this pregnancy; feels safe at home   Meds: PNVs, rhogam on 7/3/23, ASA  Allergies: ciprofloxacin    BMI: 41  Sono: vertex anterior  EFW: 2825 15        T(C): 36.4 (23 @ 05:58), Max: 36.4 (23 @ 05:58)  HR: 92 (23 @ 06:11) (92 - 92)  BP: 136/83 (23 @ 06:11) (136/83 - 136/83)  RR: 16 (23 @ 05:58) (16 - 16)  SpO2: --    Gen: NAD, well-appearing   Abd: Soft, gravid  Ext: non-tender, non-edematous      FHT: 140 baseline, moderate variability, +accels, -decels  [place]: irritability

## 2023-09-05 NOTE — OB PROVIDER H&P - ASSESSMENT
A/P: 38y  at 39w2d GA by LMP consistent with first trimester sono who presents to L&D for repeat C/S.   -Admit to L&D for   -Consent  -Admission labs  -NPO, except ice chips   -IV fluids  -Labor: Intact  -Fetus: Cat I tracing. Continuous toco and fetal monitoring.   -GBS: Negative, no GBS ppx required   -Analgesia: Spinal  -DVT ppx: Ambulate and SCD's while in bed     Discussed with Dr. Woods

## 2023-09-05 NOTE — OB RN DELIVERY SUMMARY - NS_SEPSISRSKCALC_OBGYN_ALL_OB_FT
EOS calculated successfully. EOS Risk Factor: 0.5/1000 live births ([facility] national incidence); GA=39w2d; Temp=97.5; ROM=0; GBS='Negative'; Antibiotics='No antibiotics or any antibiotics < 2 hrs prior to birth'

## 2023-09-05 NOTE — OB RN INTRAOPERATIVE NOTE - NSSELHIDDEN_OBGYN_ALL_OB_FT
[NS_DeliveryAttending1_OBGYN_ALL_OB_FT:[id number]==],[NS_DeliveryAssist2_OBGYN_ALL_OB_FT:[id number]=],[NS_DeliveryRN_OBGYN_ALL_OB_FT:[id number]=]

## 2023-09-05 NOTE — OB PROVIDER DELIVERY SUMMARY - NSSELHIDDEN_OBGYN_ALL_OB_FT
[NS_DeliveryAttending1_OBGYN_ALL_OB_FT:[id number]==],[NS_DeliveryAssist2_OBGYN_ALL_OB_FT:[id number]=],[NS_DeliveryRN_OBGYN_ALL_OB_FT:[id number]=]

## 2023-09-05 NOTE — OB PROVIDER H&P - NSLOWPPHRISK_OBGYN_A_OB
[place] Pregnancy/Less than or equal to 4 previous vaginal births/No known bleeding disorder/No history of postpartum hemorrhage/No other PPH risks indicated

## 2023-09-05 NOTE — OB RN PATIENT PROFILE - PRO PRENATAL RHOGAM YN INFANT
Caller would like to discuss an/a Return call for patient. Writer advised caller of callback within 24-72 hours.    Patient Name: Mary R Dutton  Caller Name:   Name of Facility:   Callback Number:   Best Availability:   Fax Number:   Additional Info: Caller stated that he would like to discuss some amended medical documentation for the patient to get a back brace. Please advise.   Did you confirm the message with the caller?: yes    Thank you,  Elizabeth Bueno     yes

## 2023-09-05 NOTE — OB RN DELIVERY SUMMARY - NSSELHIDDEN_OBGYN_ALL_OB_FT
[NS_DeliveryAttending1_OBGYN_ALL_OB_FT:[id number]==],[NS_DeliveryAssist2_OBGYN_ALL_OB_FT:[id number]=],[NS_DeliveryRN_OBGYN_ALL_OB_FT:[id number]=]

## 2023-09-06 ENCOUNTER — TRANSCRIPTION ENCOUNTER (OUTPATIENT)
Age: 38
End: 2023-09-06

## 2023-09-06 LAB
ABO RH CONFIRMATION: SIGNIFICANT CHANGE UP
BASOPHILS # BLD AUTO: 0.01 K/UL — SIGNIFICANT CHANGE UP (ref 0–0.2)
BASOPHILS NFR BLD AUTO: 0.1 % — SIGNIFICANT CHANGE UP (ref 0–2)
EOSINOPHIL # BLD AUTO: 0.01 K/UL — SIGNIFICANT CHANGE UP (ref 0–0.5)
EOSINOPHIL NFR BLD AUTO: 0.1 % — SIGNIFICANT CHANGE UP (ref 0–6)
FETAL SCREEN: SIGNIFICANT CHANGE UP
HCT VFR BLD CALC: 27.3 % — LOW (ref 34.5–45)
HGB BLD-MCNC: 8.9 G/DL — LOW (ref 11.5–15.5)
IMM GRANULOCYTES NFR BLD AUTO: 0.5 % — SIGNIFICANT CHANGE UP (ref 0–0.9)
LYMPHOCYTES # BLD AUTO: 1.67 K/UL — SIGNIFICANT CHANGE UP (ref 1–3.3)
LYMPHOCYTES # BLD AUTO: 13.9 % — SIGNIFICANT CHANGE UP (ref 13–44)
MCHC RBC-ENTMCNC: 31.1 PG — SIGNIFICANT CHANGE UP (ref 27–34)
MCHC RBC-ENTMCNC: 32.6 GM/DL — SIGNIFICANT CHANGE UP (ref 32–36)
MCV RBC AUTO: 95.5 FL — SIGNIFICANT CHANGE UP (ref 80–100)
MEV IGG SER-ACNC: 33.3 AU/ML — SIGNIFICANT CHANGE UP
MEV IGG+IGM SER-IMP: POSITIVE — SIGNIFICANT CHANGE UP
MONOCYTES # BLD AUTO: 1.36 K/UL — HIGH (ref 0–0.9)
MONOCYTES NFR BLD AUTO: 11.3 % — SIGNIFICANT CHANGE UP (ref 2–14)
NEUTROPHILS # BLD AUTO: 8.88 K/UL — HIGH (ref 1.8–7.4)
NEUTROPHILS NFR BLD AUTO: 74.1 % — SIGNIFICANT CHANGE UP (ref 43–77)
PLATELET # BLD AUTO: 195 K/UL — SIGNIFICANT CHANGE UP (ref 150–400)
RBC # BLD: 2.86 M/UL — LOW (ref 3.8–5.2)
RBC # FLD: 13.1 % — SIGNIFICANT CHANGE UP (ref 10.3–14.5)
WBC # BLD: 11.99 K/UL — HIGH (ref 3.8–10.5)
WBC # FLD AUTO: 11.99 K/UL — HIGH (ref 3.8–10.5)

## 2023-09-06 RX ORDER — IBUPROFEN 200 MG
600 TABLET ORAL EVERY 6 HOURS
Refills: 0 | Status: DISCONTINUED | OUTPATIENT
Start: 2023-09-06 | End: 2023-09-07

## 2023-09-06 RX ADMIN — Medication 975 MILLIGRAM(S): at 14:57

## 2023-09-06 RX ADMIN — Medication 975 MILLIGRAM(S): at 15:30

## 2023-09-06 RX ADMIN — Medication 975 MILLIGRAM(S): at 08:29

## 2023-09-06 RX ADMIN — Medication 30 MILLIGRAM(S): at 04:48

## 2023-09-06 RX ADMIN — Medication 600 MILLIGRAM(S): at 18:04

## 2023-09-06 RX ADMIN — Medication 600 MILLIGRAM(S): at 17:53

## 2023-09-06 RX ADMIN — Medication 600 MILLIGRAM(S): at 12:35

## 2023-09-06 RX ADMIN — Medication 975 MILLIGRAM(S): at 20:10

## 2023-09-06 RX ADMIN — Medication 975 MILLIGRAM(S): at 02:09

## 2023-09-06 RX ADMIN — Medication 975 MILLIGRAM(S): at 10:15

## 2023-09-06 RX ADMIN — Medication 975 MILLIGRAM(S): at 02:15

## 2023-09-06 RX ADMIN — Medication 30 MILLIGRAM(S): at 04:51

## 2023-09-06 RX ADMIN — Medication 600 MILLIGRAM(S): at 12:06

## 2023-09-06 RX ADMIN — Medication 975 MILLIGRAM(S): at 20:20

## 2023-09-06 NOTE — PROGRESS NOTE ADULT - ASSESSMENT
A/P:  GALEN JNOES is a 38y  now POD#1 s/p  section at 39w2d gestation, s/p methergine.   -Vital Signs Stable  -Voiding, tolerating PO, bowel function nml   -Heme: Hgb 11.7 --> AM labs pending   -Advance care as tolerated   -Continue routine postpartum/postoperative care and education  -Healthy female infant at bedside  -Will try to hydrate patient and encourage ambulation. TOV pending  -Dispo: Continue to monitor patient How Severe Is It?: mild Is This A New Presentation, Or A Follow-Up?: Hair Loss

## 2023-09-07 VITALS
OXYGEN SATURATION: 97 % | SYSTOLIC BLOOD PRESSURE: 121 MMHG | DIASTOLIC BLOOD PRESSURE: 69 MMHG | HEART RATE: 80 BPM | RESPIRATION RATE: 17 BRPM | TEMPERATURE: 98 F

## 2023-09-07 PROCEDURE — 86870 RBC ANTIBODY IDENTIFICATION: CPT

## 2023-09-07 PROCEDURE — 86922 COMPATIBILITY TEST ANTIGLOB: CPT

## 2023-09-07 PROCEDURE — 59050 FETAL MONITOR W/REPORT: CPT

## 2023-09-07 PROCEDURE — 86880 COOMBS TEST DIRECT: CPT

## 2023-09-07 PROCEDURE — 86765 RUBEOLA ANTIBODY: CPT

## 2023-09-07 PROCEDURE — 86850 RBC ANTIBODY SCREEN: CPT

## 2023-09-07 PROCEDURE — 80053 COMPREHEN METABOLIC PANEL: CPT

## 2023-09-07 PROCEDURE — 59025 FETAL NON-STRESS TEST: CPT

## 2023-09-07 PROCEDURE — 86900 BLOOD TYPING SEROLOGIC ABO: CPT

## 2023-09-07 PROCEDURE — 85461 HEMOGLOBIN FETAL: CPT

## 2023-09-07 PROCEDURE — 86780 TREPONEMA PALLIDUM: CPT

## 2023-09-07 PROCEDURE — 85025 COMPLETE CBC W/AUTO DIFF WBC: CPT

## 2023-09-07 PROCEDURE — 88302 TISSUE EXAM BY PATHOLOGIST: CPT

## 2023-09-07 PROCEDURE — 36415 COLL VENOUS BLD VENIPUNCTURE: CPT

## 2023-09-07 PROCEDURE — 86901 BLOOD TYPING SEROLOGIC RH(D): CPT

## 2023-09-07 RX ORDER — ACETAMINOPHEN 500 MG
3 TABLET ORAL
Qty: 60 | Refills: 0
Start: 2023-09-07 | End: 2023-09-11

## 2023-09-07 RX ORDER — IBUPROFEN 200 MG
1 TABLET ORAL
Qty: 20 | Refills: 0
Start: 2023-09-07 | End: 2023-09-11

## 2023-09-07 RX ADMIN — Medication 600 MILLIGRAM(S): at 05:10

## 2023-09-07 RX ADMIN — ENOXAPARIN SODIUM 60 MILLIGRAM(S): 100 INJECTION SUBCUTANEOUS at 00:16

## 2023-09-07 RX ADMIN — Medication 975 MILLIGRAM(S): at 08:16

## 2023-09-07 RX ADMIN — Medication 600 MILLIGRAM(S): at 11:04

## 2023-09-07 RX ADMIN — Medication 600 MILLIGRAM(S): at 05:17

## 2023-09-07 RX ADMIN — Medication 975 MILLIGRAM(S): at 09:03

## 2023-09-07 RX ADMIN — Medication 600 MILLIGRAM(S): at 00:16

## 2023-09-07 RX ADMIN — Medication 600 MILLIGRAM(S): at 00:20

## 2023-09-07 NOTE — PROGRESS NOTE ADULT - SUBJECTIVE AND OBJECTIVE BOX
INTERVAL HPI/OVERNIGHT EVENTS:  38y Female s/p c section under spinal anesthesia with duramorph for post op analgesia on 09/05/23    Vital Signs Last 24 Hrs  T(C): 36.8 (06 Sep 2023 15:47), Max: 36.8 (06 Sep 2023 00:00)  T(F): 98.2 (06 Sep 2023 15:47), Max: 98.3 (06 Sep 2023 00:00)  HR: 74 (06 Sep 2023 15:47) (65 - 78)  BP: 121/70 (06 Sep 2023 15:47) (101/65 - 121/70)  BP(mean): --  RR: 18 (06 Sep 2023 15:47) (17 - 18)  SpO2: 98% (06 Sep 2023 15:47) (95% - 98%)    Parameters below as of 06 Sep 2023 12:00  Patient On (Oxygen Delivery Method): room air            Patient's overall anesthesia satisfied    Patients pain is well controlled     No respiratory events overnight    No pruritis at this time    Patient seen and doing well     No headache      No residual numbness or weakness, sensory and motor function intact    Site not examined     No anesthetic complications or complaints noted or reported          .            
GALEN JONES is a 38y  now POD#1 s/p  section at 39w2d gestation, s/p methergine.     S:    The patient has no complaints.   Pain is controlled with current treatment regimen.   She is ambulating without difficulty and tolerating PO.   + flatus/-BM/+void  She endorses appropriate lochia, which is decreasing.   She is breastfeeding without difficulty.  Denies HA, CP, SOB, leg pain   O:    Vital Signs Last 24 Hrs  T(C): 36.6 (07 Sep 2023 04:40), Max: 36.8 (06 Sep 2023 15:47)  T(F): 97.9 (07 Sep 2023 04:40), Max: 98.2 (06 Sep 2023 15:47)  HR: 80 (07 Sep 2023 04:40) (65 - 80)  BP: 121/69 (07 Sep 2023 04:40) (101/65 - 136/84)  BP(mean): --  RR: 17 (07 Sep 2023 04:40) (17 - 18)  SpO2: 97% (07 Sep 2023 04:40) (95% - 98%)    Parameters below as of 07 Sep 2023 04:40  Patient On (Oxygen Delivery Method): room air        Gen: NAD, AOx3  Breast: Nontender, not engorged   Abdomen:  Soft, non-tender, non-distended.  Uterus:  Fundus firm below umbilicus  Incision:  Clean/dry/intact with steri-strips in place  VE:  +Lochia  Ext:  Non-tender, non-edematous                           11.7   7.52  )-----------( 241      ( 05 Sep 2023 06:30 )             34.4                         8.9    11.99 )-----------( 195      ( 06 Sep 2023 06:03 )             27.3       09-05    138  |  105  |  9.3  ----------------------------<  100<H>  4.1   |  19.0<L>  |  0.66    Ca    8.8      05 Sep 2023 17:29    TPro  5.6<L>  /  Alb  3.1<L>  /  TBili  <0.2<L>  /  DBili  x   /  AST  21  /  ALT  10  /  AlkPhos  93  09-05        
GALEN JONES is a 38y  now POD#1 s/p  section at 39w2d gestation, s/p methergine.     S:    The patient has no complaints.   Pain is controlled with current treatment regimen.   She is ambulating without difficulty and tolerating PO.   + flatus/-BM/- voiding; 270cc in bladder scan   She endorses appropriate lochia, which is decreasing.   She is breastfeeding without difficulty.  Denies HA, CP, SOB, leg pain   O:    T(C): 36.8 (23 @ 04:00), Max: 36.8 (23 @ 19:47)  HR: 78 (23 @ 04:00) (56 - 92)  BP: 105/65 (23 @ 04:00) (105/65 - 173/73)  RR: 17 (23 @ 04:00) (16 - 18)  SpO2: 98% (23 @ 04:00) (91% - 100%)  Wt(kg): --    Gen: NAD, AOx3  Breast: Nontender, not engorged   Abdomen:  Soft, non-tender, non-distended.  Uterus:  Fundus firm below umbilicus  Incision:  Clean/dry/intact with steri-strips in place  VE:  +Lochia  Ext:  Non-tender, non-edematous                           11.7   7.52  )-----------( 241      ( 05 Sep 2023 06:30 )             34.4         138  |  105  |  9.3  ----------------------------<  100<H>  4.1   |  19.0<L>  |  0.66    Ca    8.8      05 Sep 2023 17:29    TPro  5.6<L>  /  Alb  3.1<L>  /  TBili  <0.2<L>  /  DBili  x   /  AST  21  /  ALT  10  /  AlkPhos  93

## 2023-09-07 NOTE — PROGRESS NOTE ADULT - ATTENDING COMMENTS
resident note reviewed  patient seen and examined, no complaints  VS and labs reviewed  incision c/d/i with steris  lower ext: no calf tenderness bilaterally  POD #2 s/p repeat csection - stable, afebrile  encourage ambulation   routine postop care  stable for discharge home

## 2023-09-07 NOTE — DISCHARGE NOTE OB - MEDICATION SUMMARY - MEDICATIONS TO TAKE
I will START or STAY ON the medications listed below when I get home from the hospital:    ibuprofen 600 mg oral tablet  -- 1 tab(s) by mouth every 6 hours  -- Indication: For pain    Tylenol 325 mg oral tablet  -- 3 tab(s) by mouth every 6 hours  -- Indication: For pain    Prenatal Multivitamins with Folic Acid 1 mg oral tablet  -- 1 tab(s) by mouth once a day  -- Indication: For postpartum    docusate sodium 100 mg oral capsule  -- 1 cap(s) by mouth 2 times a day, As needed, Stool Softening  -- Indication: For constipation

## 2023-09-07 NOTE — PROGRESS NOTE ADULT - ASSESSMENT
A/P:  GALEN JONES is a 38y  now POD#2 s/p  section at 39w2d gestation, s/p methergine.   -Vital Signs Stable  -Voiding, tolerating PO, bowel function nml   -Heme: Hgb 11.7 --> 8.9; no signs of acute anemia  -Advance care as tolerated   -Continue routine postpartum/postoperative care and education  -Healthy female infant at bedside  -Dispo: Stable for discharge today pending attending approval

## 2023-09-07 NOTE — DISCHARGE NOTE OB - HOSPITAL COURSE
Patient underwent a  delivery, received methergine. Post-op course was uncomplicated. Pain is well controlled with PRN medication. She has no difficulty with ambulation, voiding, or PO intake. Lab values and vital signs are within normal limits prior to discharge.

## 2023-09-07 NOTE — DISCHARGE NOTE OB - CARE PROVIDER_API CALL
Rimpel, Katherinne  Obstetrics and Gynecology  [address] [place] [id number]  Phone: [phone]  Fax: [phone]  Follow Up Time:

## 2023-09-07 NOTE — DISCHARGE NOTE OB - PATIENT PORTAL LINK FT
You can access the FollowMyHealth Patient Portal offered by [facility] by registering at the following website: [email or web address]. By joining [facility]’s FollowMyHealth portal, you will also be able to view your health information using other applications (apps) compatible with our system.

## 2023-09-07 NOTE — DISCHARGE NOTE OB - CARE PLAN
1 Principal Discharge DX:	 delivery delivered  Assessment and plan of treatment:	Please call your provider in 1 week. Take medications as directed, regular diet, activity as tolerated. Exclusive breast feeding for the first 6 months is recommended. Nothing per vagina for 6 weeks (incl. sex, douching, etc). If you have additional concerns, please inform your provider.

## 2023-09-12 ENCOUNTER — NON-APPOINTMENT (OUTPATIENT)
Age: 38
End: 2023-09-12

## 2023-09-12 DIAGNOSIS — Z3A.36 36 WEEKS GESTATION OF PREGNANCY: ICD-10-CM

## 2023-09-18 LAB — SURGICAL PATHOLOGY STUDY: SIGNIFICANT CHANGE UP

## 2023-09-20 ENCOUNTER — APPOINTMENT (OUTPATIENT)
Dept: OBGYN | Facility: CLINIC | Age: 38
End: 2023-09-20
Payer: COMMERCIAL

## 2023-09-20 ENCOUNTER — NON-APPOINTMENT (OUTPATIENT)
Age: 38
End: 2023-09-20

## 2023-09-20 VITALS
SYSTOLIC BLOOD PRESSURE: 120 MMHG | BODY MASS INDEX: 37.57 KG/M2 | WEIGHT: 199 LBS | DIASTOLIC BLOOD PRESSURE: 82 MMHG | HEIGHT: 61 IN

## 2023-09-20 PROCEDURE — 0503F POSTPARTUM CARE VISIT: CPT

## 2023-10-03 ENCOUNTER — APPOINTMENT (OUTPATIENT)
Dept: OBGYN | Facility: CLINIC | Age: 38
End: 2023-10-03
Payer: COMMERCIAL

## 2023-10-03 VITALS
WEIGHT: 202 LBS | DIASTOLIC BLOOD PRESSURE: 84 MMHG | HEIGHT: 61 IN | BODY MASS INDEX: 38.14 KG/M2 | SYSTOLIC BLOOD PRESSURE: 120 MMHG

## 2023-10-03 DIAGNOSIS — L53.9 ERYTHEMATOUS CONDITION, UNSPECIFIED: ICD-10-CM

## 2023-10-03 PROCEDURE — 0503F POSTPARTUM CARE VISIT: CPT

## 2024-02-15 ENCOUNTER — OFFICE (OUTPATIENT)
Facility: LOCATION | Age: 39
Setting detail: OPHTHALMOLOGY
End: 2024-02-15
Payer: COMMERCIAL

## 2024-02-15 DIAGNOSIS — H16.223: ICD-10-CM

## 2024-02-15 DIAGNOSIS — H16.423: ICD-10-CM

## 2024-02-15 DIAGNOSIS — H35.40: ICD-10-CM

## 2024-02-15 DIAGNOSIS — H31.29: ICD-10-CM

## 2024-02-15 PROCEDURE — 92004 COMPRE OPH EXAM NEW PT 1/>: CPT | Performed by: OPHTHALMOLOGY

## 2024-02-15 PROCEDURE — 92250 FUNDUS PHOTOGRAPHY W/I&R: CPT | Performed by: OPHTHALMOLOGY

## 2024-02-15 ASSESSMENT — CONFRONTATIONAL VISUAL FIELD TEST (CVF)
OD_FINDINGS: FULL
OS_FINDINGS: FULL

## 2024-02-15 ASSESSMENT — CORNEAL SURGICAL SCARRING: OS_SCARRING: ANTERIOR

## 2024-02-15 ASSESSMENT — SUPERFICIAL PUNCTATE KERATITIS (SPK)
OD_SPK: 1+
OS_SPK: 1+

## 2024-05-31 NOTE — OB RN PATIENT PROFILE - DOES PATIENT HAVE ADVANCE DIRECTIVE
Urgent Referral faxed to the office of dr. Dr. Denis Pineda.  Home number called message left for patient to call us back.  Mobile number called, voicemail full not accepting messages at this time.   Phone number for Pollo trammell out of service.  Message left for 3rd contact number ms Trammell.    Only person listed on PHI is darlin ramirez who is not listed as a contact.   No

## 2024-07-07 NOTE — DISCHARGE NOTE OB - NS OB DC IMMUNIZATIONS MMR YN
From: Nikia Wasserman  To: Nara Gonzalez  Sent: 7/6/2024 10:52 PM CDT  Subject: Question about testing my gut    Hello Dr. Gonzalez, hope you’re well. Due to the autoimmune I have in my gut, who can I see about this or how can I test my gut microbiome?     Thank you,  Nikia   
Please advise  
No

## 2024-08-01 ENCOUNTER — OFFICE (OUTPATIENT)
Facility: LOCATION | Age: 39
Setting detail: OPHTHALMOLOGY
End: 2024-08-01
Payer: COMMERCIAL

## 2024-08-01 DIAGNOSIS — H31.29: ICD-10-CM

## 2024-08-01 DIAGNOSIS — H50.22: ICD-10-CM

## 2024-08-01 DIAGNOSIS — H35.40: ICD-10-CM

## 2024-08-01 DIAGNOSIS — H16.223: ICD-10-CM

## 2024-08-01 DIAGNOSIS — Z13.5: ICD-10-CM

## 2024-08-01 DIAGNOSIS — H16.423: ICD-10-CM

## 2024-08-01 PROCEDURE — 92060 SENSORIMOTOR EXAMINATION: CPT | Performed by: OPTOMETRIST

## 2024-08-01 PROCEDURE — 92250 FUNDUS PHOTOGRAPHY W/I&R: CPT | Mod: GY | Performed by: OPTOMETRIST

## 2024-08-01 PROCEDURE — 99213 OFFICE O/P EST LOW 20 MIN: CPT | Performed by: OPTOMETRIST

## 2024-08-01 ASSESSMENT — CONFRONTATIONAL VISUAL FIELD TEST (CVF)
OD_FINDINGS: FULL
OS_FINDINGS: FULL

## 2024-08-08 ENCOUNTER — OFFICE (OUTPATIENT)
Facility: LOCATION | Age: 39
Setting detail: OPHTHALMOLOGY
End: 2024-08-08
Payer: COMMERCIAL

## 2024-08-08 DIAGNOSIS — H50.22: ICD-10-CM

## 2024-08-08 PROCEDURE — 99212 OFFICE O/P EST SF 10 MIN: CPT | Performed by: OPTOMETRIST

## 2024-08-08 PROCEDURE — 92060 SENSORIMOTOR EXAMINATION: CPT | Performed by: OPTOMETRIST

## 2024-08-08 ASSESSMENT — CONFRONTATIONAL VISUAL FIELD TEST (CVF)
OS_FINDINGS: FULL
OD_FINDINGS: FULL

## 2025-06-25 NOTE — OB RN DELIVERY SUMMARY - BABY A: DATE/TIME OF DELIVERY
What Type Of Note Output Would You Prefer (Optional)?: Standard Output
Hpi Title: Evaluation of Skin Lesions
Year Removed: 1900
05-Sep-2023 11:30